# Patient Record
Sex: MALE | Race: BLACK OR AFRICAN AMERICAN | NOT HISPANIC OR LATINO | Employment: FULL TIME | URBAN - METROPOLITAN AREA
[De-identification: names, ages, dates, MRNs, and addresses within clinical notes are randomized per-mention and may not be internally consistent; named-entity substitution may affect disease eponyms.]

---

## 2019-10-20 ENCOUNTER — HOSPITAL ENCOUNTER (EMERGENCY)
Facility: CLINIC | Age: 64
Discharge: HOME | End: 2019-10-20
Attending: EMERGENCY MEDICINE | Admitting: EMERGENCY MEDICINE
Payer: COMMERCIAL

## 2019-10-20 VITALS
RESPIRATION RATE: 16 BRPM | HEART RATE: 70 BPM | HEIGHT: 65 IN | OXYGEN SATURATION: 99 % | SYSTOLIC BLOOD PRESSURE: 137 MMHG | BODY MASS INDEX: 25.71 KG/M2 | DIASTOLIC BLOOD PRESSURE: 59 MMHG | WEIGHT: 154.32 LBS | TEMPERATURE: 97.5 F

## 2019-10-20 DIAGNOSIS — M79.602 MUSCULOSKELETAL PAIN OF LEFT UPPER EXTREMITY: ICD-10-CM

## 2019-10-20 LAB — INTERPRETATION ECG - MUSE: NORMAL

## 2019-10-20 PROCEDURE — 99283 EMERGENCY DEPT VISIT LOW MDM: CPT

## 2019-10-20 PROCEDURE — 93005 ELECTROCARDIOGRAM TRACING: CPT

## 2019-10-20 RX ORDER — NAPROXEN 500 MG/1
500 TABLET ORAL EVERY 12 HOURS PRN
Qty: 20 TABLET | Refills: 0 | Status: SHIPPED | OUTPATIENT
Start: 2019-10-20 | End: 2023-01-02

## 2019-10-20 ASSESSMENT — MIFFLIN-ST. JEOR: SCORE: 1426.88

## 2019-10-20 ASSESSMENT — ENCOUNTER SYMPTOMS: NUMBNESS: 0

## 2019-10-20 NOTE — ED AVS SNAPSHOT
Emergency Department  [address] [place] [address]  Phone:  [phone]  Fax:  [phone]                                    Alba Pandya   MRN: [id number]    Department:   Emergency Department   Date of Visit:  10/20/2019           After Visit Summary Signature Page    I have received my discharge instructions, and my questions have been answered. I have discussed any challenges I see with this plan with the nurse or doctor.    ..........................................................................................................................................  Patient/Patient Representative Signature      ..........................................................................................................................................  Patient Representative Print Name and Relationship to Patient    ..................................................               ................................................  Date                                   Time    ..........................................................................................................................................  Reviewed by Signature/Title    ...................................................              ..............................................  Date                                               Time          22EPIC Rev 08/18

## 2019-10-20 NOTE — ED PROVIDER NOTES
"  History     Chief Complaint:  Arm Pain    The history is provided by the patient and a relative. The history is limited by a language barrier.      Alba Pandya is a 64 year old male who presents with left arm and left shoulder pain. He presents to the emergency department with his daughter, who is helping with translation at times. He reports that his pain came on 2 days ago during the day. He reports treating this with Advil with some relief but reports that it is difficult to sleep. He denies numbness or tingling. His daughter reports that he takes a blood pressure medication but denies that he has a history of heart attack or stroke.    Allergies:  No known drug allergies     Medications:    Blood pressure medication    Past Medical History:    HTN    Past Surgical History:    The patient does not have any pertinent past surgical history.    Family History:    No past pertinent family history.    Social History:  Patient presents to the emergency department with his daughter     Review of Systems   Musculoskeletal:        Left arm pain   Neurological: Negative for numbness.   All other systems reviewed and are negative.        Physical Exam     Patient Vitals for the past 24 hrs:   BP Temp Temp src Pulse Resp SpO2 Height Weight   10/20/19 1529 137/59 97.5  F (36.4  C) Oral 70 16 99 % 1.651 m (5' 5\") 70 kg (154 lb 5.2 oz)       Physical Exam  Constitutional: The patient is oriented to person, place, and time.   HENT:   Head: Atraumatic  Cardiovascular: . Intact distal pulses.    Musculoskeletal: No edema. No bony deformity. Normal range of motion. Tender along medial left scapula. Normal range of motion of shoulder. No spinal tenderness. Normal reflexes.  Neurological: The patient is alert and oriented to person, place, and time. The patient has normal strength and normal reflexes. No sensory deficit.  Skin: Skin is warm and dry. No rash noted. The patient is not diaphoretic.   Psychiatric: The patient has a " normal mood and affect.    Emergency Department Course   ECG:  Indication: arm pain  Time: 1537  Vent. Rate 68 bpm. CT interval 158. QRS duration 102. QT/QTc 382/406. P-R-T axis 59 42 59. Normal sinus rhythm. Normal ECG. Read time: 1541    Emergency Department Course:  Nursing notes and vitals reviewed. (1540) I performed an exam of the patient as documented above.     Findings and plan explained to the Patient. Patient discharged home with instructions regarding supportive care, medications, and reasons to return. The importance of close follow-up was reviewed. The patient was prescribed Naprosyn and Zanaflex    Impression & Plan      Medical Decision Making:  Alba Pandya, a 64 year old male, presents with a two day history of left arm pain that seemed to occur after waking a couple days ago. Based on history and exam, I suspect that this is musculoskeletal in nature, he has some tenderness along the medial scapular border and tightness through the left trapezius muscle. He has normal range of motion of the shoulder and no pain with palpation of the anterior shoulder. He has normal pulses and no swelling. I did consider atypical cardiac etiology, EKG is unremarkable. At this point, I feel that he can be safely discharged to home. I will have him use Naproxen for pain and Zanaflex for spasm or tightness. Return for problems    Diagnosis:    ICD-10-CM    1. Musculoskeletal pain of left upper extremity M79.602      Disposition:  discharged to home with Naprosyn and Zanaflex    Discharge Medications:  Discharge Medication List as of 10/20/2019  4:13 PM      START taking these medications    Details   naproxen (NAPROSYN) 500 MG tablet Take 1 tablet (500 mg) by mouth every 12 hours as needed for moderate pain, Disp-20 tablet, R-0, Local Print      tiZANidine (ZANAFLEX) 4 MG tablet Take 1 tablet (4 mg) by mouth 3 times daily as needed for muscle spasms, Disp-20 tablet, R-0, Local Print             José Miguel Hoang  10/20/2019     EMERGENCY DEPARTMENT  Scribe Disclosure:  I, José Miguel Hoang, am serving as a scribe on 10/20/2019 at 3:40 PM to personally document services performed by Rcihi Mancia MD based on my observations and the provider's statements to me.      Richi Mancia MD  10/20/19 [id number]

## 2020-03-11 ENCOUNTER — HEALTH MAINTENANCE LETTER (OUTPATIENT)
Age: 65
End: 2020-03-11

## 2020-07-19 ENCOUNTER — HOSPITAL ENCOUNTER (EMERGENCY)
Facility: CLINIC | Age: 65
Discharge: HOME | End: 2020-07-19
Attending: EMERGENCY MEDICINE | Admitting: EMERGENCY MEDICINE
Payer: COMMERCIAL

## 2020-07-19 VITALS
RESPIRATION RATE: 16 BRPM | OXYGEN SATURATION: 99 % | SYSTOLIC BLOOD PRESSURE: 179 MMHG | HEART RATE: 82 BPM | TEMPERATURE: 98.1 F | DIASTOLIC BLOOD PRESSURE: 95 MMHG

## 2020-07-19 DIAGNOSIS — R51.9 ACUTE NONINTRACTABLE HEADACHE, UNSPECIFIED HEADACHE TYPE: ICD-10-CM

## 2020-07-19 DIAGNOSIS — Z20.822 EXPOSURE TO 2019 NOVEL CORONAVIRUS: ICD-10-CM

## 2020-07-19 PROCEDURE — 99283 EMERGENCY DEPT VISIT LOW MDM: CPT

## 2020-07-19 PROCEDURE — C9803 HOPD COVID-19 SPEC COLLECT: HCPCS

## 2020-07-19 PROCEDURE — U0003 INFECTIOUS AGENT DETECTION BY NUCLEIC ACID (DNA OR RNA); SEVERE ACUTE RESPIRATORY SYNDROME CORONAVIRUS 2 (SARS-COV-2) (CORONAVIRUS DISEASE [COVID-19]), AMPLIFIED PROBE TECHNIQUE, MAKING USE OF HIGH THROUGHPUT TECHNOLOGIES AS DESCRIBED BY CMS-2020-01-R: HCPCS | Performed by: EMERGENCY MEDICINE

## 2020-07-19 PROCEDURE — 25000132 ZZH RX MED GY IP 250 OP 250 PS 637: Performed by: EMERGENCY MEDICINE

## 2020-07-19 RX ORDER — ACETAMINOPHEN 500 MG
1000 TABLET ORAL ONCE
Status: COMPLETED | OUTPATIENT
Start: 2020-07-19 | End: 2020-07-19

## 2020-07-19 RX ADMIN — ACETAMINOPHEN 1000 MG: 500 TABLET, FILM COATED ORAL at 17:24

## 2020-07-19 ASSESSMENT — ENCOUNTER SYMPTOMS
DIARRHEA: 0
FEVER: 0
COUGH: 0
VOMITING: 0
SORE THROAT: 1
SHORTNESS OF BREATH: 0
HEADACHES: 1

## 2020-07-19 NOTE — LETTER
July 21, 2020        Alba Pandya  [address]   [facility] [address]    This letter provides a written record that you were tested for COVID-19 on 07/19/20.     Your result was positive. This means you have COVID-19 (coronavirus).    This means that we found the virus that causes COVID-19 in your sample.    How can I protect others?If you have symptoms, stay home and away from others (self-isolate) until:You ve had no fever--and no medicine that reduces fever--for 3 full days (72 hours). And      Your other symptoms have gotten better. For example, your cough or breathing has improved. And     At least 10 days have passed since your symptoms started.    If you don t have symptoms: Stay home and away from others (self-isolate) until at least 10 days have passed since your first positive COVID-19 test.    During this time:    Stay in your own room, including for meals. Use your own bathroom if you can.    Stay away from others in your home. No hugging, kissing or shaking hands. No visitors.     Don t go to work, school or anywhere else.     Clean  high touch  surfaces often (doorknobs, counters, handles, etc.). Use a household cleaning spray or wipes. You ll find a full list on the EPA website at www.epa.gov/pesticide-registration/list-n-disinfectants-use-against-sars-cov-2.     Cover your mouth and nose with a mask, tissue or washcloth to avoid spreading germs.    Wash your hands and face often with soap and water.    Caregivers in these groups are at risk for severe illness due to COVID-19:  o People 65 years and older  o People who live in a nursing home or long-term care facility  o People with chronic disease (lung, heart, cancer, diabetes, kidney, liver, immunologic)  o People who have a weakened immune system, including those who:  - Are in cancer treatment  - Take medicine that weakens the immune system, such as corticosteroids  - Had a bone marrow or organ transplant  - Have an immune  deficiency  - Have poorly controlled HIV or AIDS  - Are obese (body mass index of 40 or higher)  - Smoke regularly    Caregivers should wear gloves while washing dishes, handling laundry and cleaning bedrooms and bathrooms.    Wash and dry laundry with special caution. Don t shake dirty laundry, and use the warmest water setting you can.    If you have a weakened immune system, ask your doctor about other actions you should take.    For more tips, go to www.cdc.gov/coronavirus/2019-ncov/downloads/10Things.pdf.    You should not go back to work until you meet the guidelines above for ending your home isolation. You should meet these along with any other guidelines that your employer has.    Employers: This document serves as formal notice of your employee s medical guidelines for going back to work. They must meet the above guidelines before going back to work in person.    How can I take care of myself?    1. Get lots of rest. Drink extra fluids (unless a doctor has told you not to).    2. Take Tylenol (acetaminophen) for fever or pain. If you have liver or kidney problems, ask your family doctor if it s okay to take Tylenol.     Take either:     650 mg (two 325 mg pills) every 4 to 6 hours, or     1,000 mg (two 500 mg pills) every 8 hours as needed.     Note: Don t take more than 3,000 mg in one day. Acetaminophen is found in many medicines (both prescribed and over-the-counter medicines). Read all labels to be sure you don t take too much.    For children, check the Tylenol bottle for the right dose (based on their age or weight).    3. If you have other health problems (like cancer, heart failure, an organ transplant or severe kidney disease): Call your specialty clinic if you don t feel better in the next 2 days.    4. Know when to call 911: Emergency warning signs include:    Trouble breathing or shortness of breath    Pain or pressure in the chest that doesn t go away    Feeling confused like you haven t felt  before, or not being able to wake up    Bluish-colored lips or face    5. Sign up for [employer]. We know it s scary to hear that you have COVID-19. We want to track your symptoms to make sure you re okay over the next 2 weeks. Please look for an email from [employer]--this is a free, online program that we ll use to keep in touch. To sign up, follow the link in the email. Learn more at [email or web address].      Where can I get more information?    [facility]: [email or web address]    Coronavirus Basics: www.health.[facility].mn.us/diseases/coronavirus/basics.html    What to Do If You re Sick: www.cdc.gov/coronavirus/2019-ncov/about/steps-when-sick.html    Ending Home Isolation: www.cdc.gov/coronavirus/2019-ncov/hcp/disposition-in-home-patients.html     Caring for Someone with COVID-19: www.cdc.gov/coronavirus/2019-ncov/if-you-are-sick/care-for-someone.html     [facility] clinical trials (COVID-19 research studies): [email or web address]

## 2020-07-19 NOTE — LETTER
July 19, 2020      To Whom It May Concern:      Alba Pandya was seen in our Emergency Department today, 07/19/20. He has been tested for COVID-19 and cannot return to work until test result is negative or, if positive, after symptoms are improved.    Sincerely,        Hazel Weinberg MD

## 2020-07-19 NOTE — ED AVS SNAPSHOT
Emergency Department  [address] [facility] [address]  Phone:  [phone]  Fax:  [phone]                                    Alba Pandya   MRN: [id number]    Department:   Emergency Department   Date of Visit:  7/19/2020           After Visit Summary Signature Page    I have received my discharge instructions, and my questions have been answered. I have discussed any challenges I see with this plan with the nurse or doctor.    ..........................................................................................................................................  Patient/Patient Representative Signature      ..........................................................................................................................................  Patient Representative Print Name and Relationship to Patient    ..................................................               ................................................  Date                                   Time    ..........................................................................................................................................  Reviewed by Signature/Title    ...................................................              ..............................................  Date                                               Time          22EPIC Rev 08/18

## 2020-07-19 NOTE — ED PROVIDER NOTES
History     Chief Complaint:  Cough    The history is provided by the patient.      Alba Pandya is a 64 year old male with a history of diabetes and HTN who presents with a concern for COVID-19. Yesterday, he developed a mild headache and a sore throat. He denies fever, cough, myalgias, vomiting, diarrhea, chest pain, or shortness of breath. However, he found out his sister-in-law, who he has had rejcent contact with, just tested positive for COVID-19 which prompted his concern.    Allergies:  No known drug allergies     Medications:    Naprosyn  Zanaflex  Aspirin  Zyrtec   Hygroton  Norvasc  Metformin  Zocor  Cozaar  Coreg    Past Medical History:    GERD  Diabetic retinopathy of both eyes  Diabetes type 2  Hyperlipidemia  Hypertension    Past Surgical History:    History reviewed. No pertinent surgical history.    Family History:    Diabetes    Social History:  Smoking status: never  Alcohol use: no  Marital Status:  Legally   Presents alone.    Review of Systems   Constitutional: Negative for fever.   HENT: Positive for sore throat.    Respiratory: Negative for cough and shortness of breath.    Cardiovascular: Negative for chest pain.   Gastrointestinal: Negative for diarrhea and vomiting.   Musculoskeletal: Negative for myalgias.   Neurological: Positive for headaches.   All other systems reviewed and are negative.    Physical Exam     Patient Vitals for the past 24 hrs:   BP Temp Temp src Pulse Heart Rate Resp SpO2   07/19/20 1724 -- -- -- -- -- -- 99 %   07/19/20 1723 (!) 179/95 -- -- 82 -- -- --   07/19/20 1619 (!) 187/109 98.1  F (36.7  C) Oral 85 85 16 100 %       Physical Exam  General: Well-developed and well-nourished. Well appearing middle aged man. Cooperative.  Head:  Atraumatic.  Eyes:  Conjunctivae, lids, and sclerae are normal.  ENT:    Normal nose. Moist mucous membranes.  No significant posterior oropharyngeal erythema, edema, or exudate.  Neck:  Supple. Normal range of  motion.  CV:  Regular rate and rhythm. Normal heart sounds with no murmurs, rubs, or gallops detected.  Resp:  No respiratory distress. Clear to auscultation bilaterally without decreased breath sounds, wheezing, rales, or rhonchi.  GI:  Non-distended.    MS:  Normal ROM.   Skin:  Warm. Non-diaphoretic. No pallor.  Neuro:  Awake. A&Ox3. Normal strength.  Psych: Normal mood and affect. Normal speech.  Vitals reviewed.      Emergency Department Course     Laboratory:  Laboratory findings were communicated with the patient who voiced understanding of the findings.    Symptomatic COVID-19 by PCR: In process     Interventions:  1724 Acetaminophen, 1000 mg, PO    Emergency Department Course:  Past medical records, nursing notes, and vitals reviewed.  1710: I performed an exam of the patient and obtained history, as documented above.     1714: I rechecked the patient. Findings and plan explained to the patient. Patient discharged home with instructions regarding supportive care, medications, and reasons to return. The importance of close follow-up was reviewed.     Impression & Plan      Medical Decision Making:  Alba is a 64 year old man who was exposed to his sister-in-law who found out yesterday she had COVID-19. He also developed headache and sore throat yesterday and was concerned this may represent COVID-19. He denies all other concerns or complaints including fever, cough, chest pain, shortness of breath, and myalgias. He appears quite well on exam and states he simply wants COVID-19 testing. I did give him Tylenol for his pains and swabbed him for COVID-19. However, because his complaints are quite mild and he appears quite well on exam with clear lungs, no respiratory distress, and no hypoxia, he does not require further urgent testing or treatment in the emergency department. I discussed supportive care for Alba while awaiting his COVID-19 test results including rest, fluids, over-the-counter  analgesics/antipyretics, and - most importantly - strict isolation. We discussed return precautions and otherwise he will follow-up with his primary care provider with phone or virtual visit.  All of his questions were answered and he verbalized understanding. Amenable to discharge.    Covid-19  Alba Pandya was evaluated during a global COVID-19 pandemic, which necessitated consideration that the patient might be at risk for infection with the SARS-CoV-2 virus that causes COVID-19.   Applicable protocols for evaluation were followed during the patient's care.   COVID-19 was considered as part of the patient's evaluation. The plan for testing is:  a test was obtained during this visit.      Diagnosis:    ICD-10-CM    1. Exposure to 2019 novel coronavirus  Z20.828 Symptomatic COVID-19 Virus (Coronavirus) by PCR   2. Acute nonintractable headache, unspecified headache type  R51        Disposition:  discharged home    Scribe Disclosure:  IMariana, am serving as a scribe at 5:38 PM on 7/19/2020 to document services personally performed by Hazel Weinberg MD based on my observations and the provider's statements to me.     Mariana Blair  7/19/2020    EMERGENCY DEPARTMENT       Hazel Weinberg MD  07/22/20 6728

## 2020-07-19 NOTE — DISCHARGE INSTRUCTIONS
You will be called if positive test results in 1-2 days.   You need to isolate for 10 days from when symptoms started AND 72 hours after fever resolves (without fever reducing medications) AND improvement of respiratory symptoms (whichever is longer)  You and any members of your household should limit activities in public for 14 days after starting home isolation.   Follow CDC recommendations for isolation, household cleaning, etc.  Lots of rest and fluids.  Tylenol or ibuprofen as needed for pain or fever.  Return with severe worsening of symptoms that would require hospitalization or new concerns.  Otherwise, follow up with your primary care provider via phone or virtual visit this week to ensure you are approving as expected.

## 2020-07-20 LAB
SARS-COV-2 RNA SPEC QL NAA+PROBE: ABNORMAL
SPECIMEN SOURCE: ABNORMAL

## 2020-07-21 ENCOUNTER — TELEPHONE (OUTPATIENT)
Dept: EMERGENCY MEDICINE | Facility: CLINIC | Age: 65
End: 2020-07-21

## 2020-07-22 ASSESSMENT — ENCOUNTER SYMPTOMS: MYALGIAS: 0

## 2020-09-22 NOTE — TELEPHONE ENCOUNTER
"Coronavirus (COVID-19) Notification    Caller Name (Patient, parent, daughter/son, grandparent, etc)  Alba Pandya (Patient) - patient answered the phone and handed it to his daughter, Eduardo, to speak with me. All information discussed with his daughter today per pt's wishes    Reason for call  Notify of Positive Coronavirus (COVID-19) lab results, assess symptoms,  review [facility] recommendations    Lab Result    Lab test:  2019-nCoV rRt-PCR or SARS-CoV-2 PCR    Oropharyngeal AND/OR nasopharyngeal swabs is POSITIVE for 2019-nCoV RNA/SARS-COV-2 PCR (COVID-19 virus)    RN Recommendations/Instructions per [facility] Coronavirus COVID-19 recommendations    Brief introduction script  Introduce self then review script:  \"I am calling on behalf of [employer].  We were notified that your Coronavirus test (COVID-19) for was POSITIVE for the virus.  I have some information to relay to you but first I wanted to mention that the [place] Dept of Health will be contacting you shortly [it's possible MD already called Patient] to talk to you more about how you are feeling and other people you have had contact with who might now also have the virus.  Also, [facility] is Partnering with the [facility] for Covid-19 research, you may be contacted directly by research staff.\"    Assessment (Inquire about Patient's current symptoms)   Assessment   Current Symptoms at time of phone call: (if no symptoms, document No symptoms] No symptoms. Sore throat. Someone in the home   Symptoms onset (if applicable) 7/18/20     If at time of call, Patients symptoms hare worsened, the Patient should contact 911 or have someone drive them to Emergency Dept promptly:      If Patient calling 911, inform 911 personal that you have tested positive for the Coronavirus (COVID-19).  Place mask on and await 911 to arrive.    If Emergency Dept, If possible, please have another adult drive you to the Emergency Dept but you need " Yes, pt would like to try [employer]. Please send into [facility].  F/yanique appt 10/6   to wear mask when in contact with other people.      Review information with Patient    Your result was positive. This means you have COVID-19 (coronavirus).  We have sent you a letter that reviews the information that I'll be reviewing with you now.    How can I protect others?    If you have symptoms: stay home and away from others (self-isolate) until:    You've had no fever--and no medicine that reduces fever--for 3 full days (72 hours). And      Your other symptoms have gotten better. For example, your cough or breathing has improved. And     At least 10 days have passed since your symptoms started.    If you don't have symptoms: Stay home and away from others (self-isolate) until at least 10 days have passed since your first positive COVID-19 test. (Date test collected)    During this time:    Stay in your own room, including for meals. Use your own bathroom if you can.    Stay away from others in your home. No hugging, kissing or shaking hands. No visitors.     Don't go to work, school or anywhere else.     Clean  high touch  surfaces often (doorknobs, counters, handles, etc.). Use a household cleaning spray or wipes. You'll find a full list on the EPA website at www.epa.gov/pesticide-registration/list-n-disinfectants-use-against-sars-cov-2.     Cover your mouth and nose with a mask, tissue or washcloth to avoid spreading germs.    Wash your hands and face often with soap and water.    Caregivers in these groups are at risk for severe illness due to COVID-19:  o People 65 years and older  o People who live in a nursing home or long-term care facility  o People with chronic disease (lung, heart, cancer, diabetes, kidney, liver, immunologic)  o People who have a weakened immune system, including those who:  - Are in cancer treatment  - Take medicine that weakens the immune system, such as corticosteroids  - Had a bone marrow or organ transplant  - Have an immune deficiency  - Have poorly controlled HIV or  AIDS  - Are obese (body mass index of 40 or higher)  - Smoke regularly    Caregivers should wear gloves while washing dishes, handling laundry and cleaning bedrooms and bathrooms.    Wash and dry laundry with special caution. Don't shake dirty laundry, and use the warmest water setting you can.    If you have a weakened immune system, ask your doctor about other actions you should take.    For more tips, go to www.cdc.gov/coronavirus/2019-ncov/downloads/10Things.pdf.    You should not go back to work until you meet the guidelines above for ending your home isolation. You should meet these along with any other guidelines that your employer has.    Employers: This document serves as formal notice of your employee's medical guidelines for going back to work. They must meet the above guidelines before going back to work in person.    How can I take care of myself?    1. Get lots of rest. Drink extra fluids (unless a doctor has told you not to).    2. Take Tylenol (acetaminophen) for fever or pain. If you have liver or kidney problems, ask your family doctor if it's okay to take Tylenol.     Take either:     650 mg (two 325 mg pills) every 4 to 6 hours, or     1,000 mg (two 500 mg pills) every 8 hours as needed.     Note: Don't take more than 3,000 mg in one day. Acetaminophen is found in many medicines (both prescribed and over-the-counter medicines). Read all labels to be sure you don't take too much.    For children, check the Tylenol bottle for the right dose (based on their age or weight).    3. If you have other health problems (like cancer, heart failure, an organ transplant or severe kidney disease): Call your specialty clinic if you don't feel better in the next 2 days.    4. Know when to call 911: Emergency warning signs include:    Trouble breathing or shortness of breath    Pain or pressure in the chest that doesn't go away    Feeling confused like you haven't felt before, or not being able to wake  up    Bluish-colored lips or face    5. Sign up for [employer]. We know it's scary to hear that you have COVID-19. We want to track your symptoms to make sure you're okay over the next 2 weeks. Please look for an email from [employer]--this is a free, online program that we'll use to keep in touch. To sign up, follow the link in the email. Learn more at [email or web address].    Where can I get more information?    [facility]: [email or web address]    Coronavirus Basics: www.health.[facility].[place]./diseases/coronavirus/basics.html    What to Do If You're Sick: www.cdc.gov/coronavirus/2019-ncov/about/steps-when-sick.html    Ending Home Isolation: www.cdc.gov/coronavirus/2019-ncov/hcp/disposition-in-home-patients.html     Caring for Someone with COVID-19: www.cdc.gov/coronavirus/2019-ncov/if-you-are-sick/care-for-someone.html     [facility] clinical trials (COVID-19 research studies): clinicalaffairs.[employer].[facility]/[employer]-clinical-trials     Positive COVID-19 letter sent via [employer] or mail (Yes/No):  Yes     [Name]  KILO Alonso, RN

## 2021-01-03 ENCOUNTER — HEALTH MAINTENANCE LETTER (OUTPATIENT)
Age: 66
End: 2021-01-03

## 2021-04-25 ENCOUNTER — HEALTH MAINTENANCE LETTER (OUTPATIENT)
Age: 66
End: 2021-04-25

## 2021-10-10 ENCOUNTER — HEALTH MAINTENANCE LETTER (OUTPATIENT)
Age: 66
End: 2021-10-10

## 2022-05-22 ENCOUNTER — HEALTH MAINTENANCE LETTER (OUTPATIENT)
Age: 67
End: 2022-05-22

## 2022-09-18 ENCOUNTER — HEALTH MAINTENANCE LETTER (OUTPATIENT)
Age: 67
End: 2022-09-18

## 2023-01-02 ENCOUNTER — APPOINTMENT (OUTPATIENT)
Dept: CT IMAGING | Facility: CLINIC | Age: 68
End: 2023-01-02
Attending: INTERNAL MEDICINE
Payer: COMMERCIAL

## 2023-01-02 ENCOUNTER — APPOINTMENT (OUTPATIENT)
Dept: GENERAL RADIOLOGY | Facility: CLINIC | Age: 68
End: 2023-01-02
Attending: EMERGENCY MEDICINE
Payer: COMMERCIAL

## 2023-01-02 ENCOUNTER — HOSPITAL ENCOUNTER (OUTPATIENT)
Facility: CLINIC | Age: 68
Setting detail: OBSERVATION
Discharge: HOME | End: 2023-01-03
Attending: EMERGENCY MEDICINE | Admitting: EMERGENCY MEDICINE
Payer: COMMERCIAL

## 2023-01-02 DIAGNOSIS — R94.31 ABNORMAL ELECTROCARDIOGRAM: ICD-10-CM

## 2023-01-02 DIAGNOSIS — R93.89 NODULAR RADIOLOGIC DENSITY: ICD-10-CM

## 2023-01-02 DIAGNOSIS — M79.622 PAIN OF LEFT UPPER ARM: ICD-10-CM

## 2023-01-02 LAB
ALBUMIN SERPL-MCNC: 3.9 G/DL (ref 3.4–5)
ALP SERPL-CCNC: 44 U/L (ref 40–150)
ALT SERPL W P-5'-P-CCNC: 13 U/L (ref 0–70)
ANION GAP SERPL CALCULATED.3IONS-SCNC: 8 MMOL/L (ref 3–14)
AST SERPL W P-5'-P-CCNC: 13 U/L (ref 0–45)
ATRIAL RATE - MUSE: 67 BPM
BASOPHILS # BLD AUTO: 0 10E3/UL (ref 0–0.2)
BASOPHILS NFR BLD AUTO: 1 %
BILIRUB SERPL-MCNC: 0.4 MG/DL (ref 0.2–1.3)
BUN SERPL-MCNC: 25 MG/DL (ref 7–30)
CALCIUM SERPL-MCNC: 8.5 MG/DL (ref 8.5–10.1)
CHLORIDE BLD-SCNC: 108 MMOL/L (ref 94–109)
CO2 SERPL-SCNC: 22 MMOL/L (ref 20–32)
CREAT SERPL-MCNC: 0.95 MG/DL (ref 0.66–1.25)
CREAT SERPL-MCNC: 1.24 MG/DL (ref 0.66–1.25)
D DIMER PPP FEU-MCNC: 0.81 UG/ML FEU (ref 0–0.5)
DIASTOLIC BLOOD PRESSURE - MUSE: NORMAL MMHG
EOSINOPHIL # BLD AUTO: 0.3 10E3/UL (ref 0–0.7)
EOSINOPHIL NFR BLD AUTO: 7 %
ERYTHROCYTE [DISTWIDTH] IN BLOOD BY AUTOMATED COUNT: 12.4 % (ref 10–15)
GFR SERPL CREATININE-BSD FRML MDRD: 65 ML/MIN/1.73M2
GFR SERPL CREATININE-BSD FRML MDRD: 89 ML/MIN/1.73M2
GLUCOSE BLD-MCNC: 180 MG/DL (ref 70–99)
HCT VFR BLD AUTO: 33.2 % (ref 40–53)
HGB BLD-MCNC: 11.1 G/DL (ref 13.3–17.7)
HOLD SPECIMEN: 0
HOLD SPECIMEN: NORMAL
HOLD SPECIMEN: NORMAL
IMM GRANULOCYTES # BLD: 0 10E3/UL
IMM GRANULOCYTES NFR BLD: 0 %
INTERPRETATION ECG - MUSE: NORMAL
LYMPHOCYTES # BLD AUTO: 1 10E3/UL (ref 0.8–5.3)
LYMPHOCYTES NFR BLD AUTO: 22 %
MCH RBC QN AUTO: 30.4 PG (ref 26.5–33)
MCHC RBC AUTO-ENTMCNC: 33.4 G/DL (ref 31.5–36.5)
MCV RBC AUTO: 91 FL (ref 78–100)
MONOCYTES # BLD AUTO: 0.3 10E3/UL (ref 0–1.3)
MONOCYTES NFR BLD AUTO: 7 %
NEUTROPHILS # BLD AUTO: 2.9 10E3/UL (ref 1.6–8.3)
NEUTROPHILS NFR BLD AUTO: 63 %
NRBC # BLD AUTO: 0 10E3/UL
NRBC BLD AUTO-RTO: 0 /100
P AXIS - MUSE: 43 DEGREES
PLATELET # BLD AUTO: 151 10E3/UL (ref 150–450)
POTASSIUM BLD-SCNC: 4.4 MMOL/L (ref 3.4–5.3)
PR INTERVAL - MUSE: 152 MS
PROT SERPL-MCNC: 7.7 G/DL (ref 6.8–8.8)
QRS DURATION - MUSE: 94 MS
QT - MUSE: 390 MS
QTC - MUSE: 412 MS
R AXIS - MUSE: 41 DEGREES
RBC # BLD AUTO: 3.65 10E6/UL (ref 4.4–5.9)
SODIUM SERPL-SCNC: 138 MMOL/L (ref 133–144)
SYSTOLIC BLOOD PRESSURE - MUSE: NORMAL MMHG
T AXIS - MUSE: -14 DEGREES
TROPONIN I SERPL HS-MCNC: 4 NG/L
VENTRICULAR RATE- MUSE: 67 BPM
WBC # BLD AUTO: 4.6 10E3/UL (ref 4–11)

## 2023-01-02 PROCEDURE — 36415 COLL VENOUS BLD VENIPUNCTURE: CPT | Performed by: HOSPITALIST

## 2023-01-02 PROCEDURE — 71046 X-RAY EXAM CHEST 2 VIEWS: CPT

## 2023-01-02 PROCEDURE — 250N000009 HC RX 250: Performed by: HOSPITALIST

## 2023-01-02 PROCEDURE — 36415 COLL VENOUS BLD VENIPUNCTURE: CPT | Performed by: EMERGENCY MEDICINE

## 2023-01-02 PROCEDURE — G0378 HOSPITAL OBSERVATION PER HR: HCPCS

## 2023-01-02 PROCEDURE — 85379 FIBRIN DEGRADATION QUANT: CPT | Performed by: INTERNAL MEDICINE

## 2023-01-02 PROCEDURE — 250N000011 HC RX IP 250 OP 636: Performed by: HOSPITALIST

## 2023-01-02 PROCEDURE — 99291 CRITICAL CARE FIRST HOUR: CPT | Mod: 25

## 2023-01-02 PROCEDURE — 82565 ASSAY OF CREATININE: CPT | Performed by: PHYSICIAN ASSISTANT

## 2023-01-02 PROCEDURE — 250N000013 HC RX MED GY IP 250 OP 250 PS 637: Performed by: HOSPITALIST

## 2023-01-02 PROCEDURE — 80053 COMPREHEN METABOLIC PANEL: CPT | Performed by: EMERGENCY MEDICINE

## 2023-01-02 PROCEDURE — 93005 ELECTROCARDIOGRAM TRACING: CPT

## 2023-01-02 PROCEDURE — 85025 COMPLETE CBC W/AUTO DIFF WBC: CPT | Performed by: EMERGENCY MEDICINE

## 2023-01-02 PROCEDURE — 84484 ASSAY OF TROPONIN QUANT: CPT | Performed by: HOSPITALIST

## 2023-01-02 PROCEDURE — 99222 1ST HOSP IP/OBS MODERATE 55: CPT | Mod: AI | Performed by: HOSPITALIST

## 2023-01-02 PROCEDURE — 99223 1ST HOSP IP/OBS HIGH 75: CPT | Mod: FS | Performed by: PHYSICIAN ASSISTANT

## 2023-01-02 PROCEDURE — 250N000013 HC RX MED GY IP 250 OP 250 PS 637: Performed by: EMERGENCY MEDICINE

## 2023-01-02 PROCEDURE — 84484 ASSAY OF TROPONIN QUANT: CPT | Performed by: EMERGENCY MEDICINE

## 2023-01-02 PROCEDURE — 71275 CT ANGIOGRAPHY CHEST: CPT

## 2023-01-02 RX ORDER — CLARITHROMYCIN 500 MG
500 TABLET ORAL 2 TIMES DAILY
Status: DISCONTINUED | OUTPATIENT
Start: 2023-01-02 | End: 2023-01-03 | Stop reason: HOSPADM

## 2023-01-02 RX ORDER — CHLORTHALIDONE 25 MG/1
25 TABLET ORAL DAILY
Status: ON HOLD | COMMUNITY
End: 2024-06-29

## 2023-01-02 RX ORDER — NITROGLYCERIN 0.4 MG/1
0.4 TABLET SUBLINGUAL EVERY 5 MIN PRN
Status: DISCONTINUED | OUTPATIENT
Start: 2023-01-02 | End: 2023-01-03 | Stop reason: HOSPADM

## 2023-01-02 RX ORDER — CARVEDILOL 25 MG/1
25 TABLET ORAL 2 TIMES DAILY WITH MEALS
Status: DISCONTINUED | OUTPATIENT
Start: 2023-01-02 | End: 2023-01-03 | Stop reason: HOSPADM

## 2023-01-02 RX ORDER — PANTOPRAZOLE SODIUM 40 MG/1
40 TABLET, DELAYED RELEASE ORAL 2 TIMES DAILY
Status: DISCONTINUED | OUTPATIENT
Start: 2023-01-02 | End: 2023-01-03 | Stop reason: HOSPADM

## 2023-01-02 RX ORDER — AMLODIPINE BESYLATE 10 MG/1
10 TABLET ORAL DAILY
Status: DISCONTINUED | OUTPATIENT
Start: 2023-01-03 | End: 2023-01-03 | Stop reason: HOSPADM

## 2023-01-02 RX ORDER — LOSARTAN POTASSIUM 100 MG/1
100 TABLET ORAL DAILY
Status: ON HOLD | COMMUNITY
End: 2024-06-29

## 2023-01-02 RX ORDER — AMMONIUM LACTATE 12 G/100G
LOTION TOPICAL 2 TIMES DAILY PRN
Status: DISCONTINUED | OUTPATIENT
Start: 2023-01-02 | End: 2023-01-03 | Stop reason: HOSPADM

## 2023-01-02 RX ORDER — LOSARTAN POTASSIUM 100 MG/1
100 TABLET ORAL DAILY
Status: DISCONTINUED | OUTPATIENT
Start: 2023-01-03 | End: 2023-01-03 | Stop reason: HOSPADM

## 2023-01-02 RX ORDER — SIMVASTATIN 20 MG
20 TABLET ORAL DAILY
COMMUNITY

## 2023-01-02 RX ORDER — AMOXICILLIN 500 MG/1
1000 CAPSULE ORAL 2 TIMES DAILY
COMMUNITY
End: 2024-06-28

## 2023-01-02 RX ORDER — AMMONIUM LACTATE 12 G/100G
LOTION TOPICAL 2 TIMES DAILY PRN
COMMUNITY

## 2023-01-02 RX ORDER — IOPAMIDOL 755 MG/ML
63 INJECTION, SOLUTION INTRAVASCULAR ONCE
Status: COMPLETED | OUTPATIENT
Start: 2023-01-02 | End: 2023-01-02

## 2023-01-02 RX ORDER — CARVEDILOL 25 MG/1
25 TABLET ORAL 2 TIMES DAILY WITH MEALS
COMMUNITY

## 2023-01-02 RX ORDER — METOPROLOL TARTRATE 50 MG
50-100 TABLET ORAL
Status: COMPLETED | OUTPATIENT
Start: 2023-01-02 | End: 2023-01-03

## 2023-01-02 RX ORDER — KETOCONAZOLE 20 MG/G
CREAM TOPICAL 2 TIMES DAILY PRN
COMMUNITY

## 2023-01-02 RX ORDER — ASPIRIN 81 MG/1
162 TABLET, CHEWABLE ORAL ONCE
Status: DISCONTINUED | OUTPATIENT
Start: 2023-01-02 | End: 2023-01-02

## 2023-01-02 RX ORDER — KETOCONAZOLE 20 MG/G
CREAM TOPICAL 2 TIMES DAILY PRN
Status: DISCONTINUED | OUTPATIENT
Start: 2023-01-02 | End: 2023-01-03 | Stop reason: HOSPADM

## 2023-01-02 RX ORDER — AMLODIPINE BESYLATE 10 MG/1
10 TABLET ORAL DAILY
COMMUNITY

## 2023-01-02 RX ORDER — MAGNESIUM HYDROXIDE/ALUMINUM HYDROXICE/SIMETHICONE 120; 1200; 1200 MG/30ML; MG/30ML; MG/30ML
30 SUSPENSION ORAL EVERY 4 HOURS PRN
Status: DISCONTINUED | OUTPATIENT
Start: 2023-01-02 | End: 2023-01-03 | Stop reason: HOSPADM

## 2023-01-02 RX ORDER — CLARITHROMYCIN 500 MG
500 TABLET ORAL 2 TIMES DAILY
COMMUNITY
End: 2024-06-28

## 2023-01-02 RX ORDER — AMOXICILLIN 500 MG/1
1000 CAPSULE ORAL 2 TIMES DAILY
Status: DISCONTINUED | OUTPATIENT
Start: 2023-01-02 | End: 2023-01-03 | Stop reason: HOSPADM

## 2023-01-02 RX ORDER — ASPIRIN 325 MG
325 TABLET ORAL ONCE
Status: COMPLETED | OUTPATIENT
Start: 2023-01-02 | End: 2023-01-02

## 2023-01-02 RX ORDER — ACETAMINOPHEN 500 MG
1000 TABLET ORAL EVERY 6 HOURS PRN
Status: DISCONTINUED | OUTPATIENT
Start: 2023-01-02 | End: 2023-01-03 | Stop reason: HOSPADM

## 2023-01-02 RX ORDER — CHLORTHALIDONE 25 MG/1
25 TABLET ORAL DAILY
Status: DISCONTINUED | OUTPATIENT
Start: 2023-01-03 | End: 2023-01-03 | Stop reason: HOSPADM

## 2023-01-02 RX ORDER — ASPIRIN 81 MG/1
81 TABLET ORAL DAILY
Status: DISCONTINUED | OUTPATIENT
Start: 2023-01-03 | End: 2023-01-03 | Stop reason: HOSPADM

## 2023-01-02 RX ADMIN — IOPAMIDOL 63 ML: 755 INJECTION, SOLUTION INTRAVENOUS at 18:56

## 2023-01-02 RX ADMIN — CARVEDILOL 25 MG: 25 TABLET, FILM COATED ORAL at 18:42

## 2023-01-02 RX ADMIN — SODIUM CHLORIDE 89 ML: 900 INJECTION INTRAVENOUS at 18:56

## 2023-01-02 RX ADMIN — CLARITHROMYCIN 500 MG: 500 TABLET, FILM COATED ORAL at 19:40

## 2023-01-02 RX ADMIN — ASPIRIN 325 MG ORAL TABLET 325 MG: 325 PILL ORAL at 14:28

## 2023-01-02 RX ADMIN — AMOXICILLIN 1000 MG: 500 CAPSULE ORAL at 19:40

## 2023-01-02 RX ADMIN — PANTOPRAZOLE SODIUM 40 MG: 40 TABLET, DELAYED RELEASE ORAL at 19:40

## 2023-01-02 ASSESSMENT — ACTIVITIES OF DAILY LIVING (ADL)
ADLS_ACUITY_SCORE: 35
ADLS_ACUITY_SCORE: 37
ADLS_ACUITY_SCORE: 35
ADLS_ACUITY_SCORE: 35
ADLS_ACUITY_SCORE: 37
ADLS_ACUITY_SCORE: 37

## 2023-01-02 NOTE — PROGRESS NOTES
RECEIVING UNIT ED HANDOFF REVIEW    ED Nurse Handoff Report was reviewed by: Tesfaye Alfredo RN on January 2, 2023 at 4:19 PM

## 2023-01-02 NOTE — CONSULTS
"[facility]  Cardiology Consultation     Date of Admission:  1/2/2023  Date of Consult (When I saw the patient): 01/02/23  Reason for Consult: Left Arm Pain    Primary Cardiologist: No previous cardiology    -------------------------------------------------------------------------------------------  Assessment:  Alba Pandya is a 67 year old male who was admitted on 1/2/2023. I was asked to see the patient for left arm pain.     # Left Arm Pain   -- initial hs trop neg  -- ECG with nonspecific TWI  -- Echo pending   -- no prior cardiac history but has some risk factors: T2DM, HTN, and HLD     # Mild Anemia   -- no clinic symptoms of bleeding     # HTN  -- at goal    # HLD   -- pta includes simvastatin, no recent LDL     # T2DM   -- hA1c 6.7% on oral agents    # Recent treatment of H Pylori    # Hx of Latent TB       Clinically Significant Risk Factors Present on Admission                # Overweight: Estimated body mass index is 27.29 kg/m  as calculated from the following:    Height as of this encounter: 1.651 m (5' 5\").    Weight as of this encounter: 74.4 kg (164 lb).    Limitations of activities/Fatigue (optional): Chronic Fatigue and Other Debilities: Age-related physical debility  Chronic fatigue, unspecified    -------------------------------------------------------------------------------------------  Plan:  -- Trend troponin   -- Full resting echocardiogram   -- NPO at midnight for CT coronary angiogram tomorrow (1/3)  -- Check D dimer and if high, will consider chest CT to rule out dissection   -- Recheck CBC   -- Cardiology will follow     -------------------------------------------------------------------------------------------    History of Present Illness   Alba Pandya is a 67 year old male who presents to the emergency department with 3-day history of left upper arm discomfort.    His past medical history is notable for hypertension (controlled), hyperlipidemia (no " recent antibiotic; on simvastatin), type 2 diabetes mellitus (hemoglobin A1c 6.7% on oral agents), GERD with recent treatment for H. pylori infection, and history of latent TB (treated with history of lung scarring).    I met Alba along with his 3 children today.  He describes left arm discomfort that starts from his shoulder and goes down to his elbow on the lateral aspect of his arm that feels like squeezing type of discomfort.  When he sits up or moves his arm above his head he develops sharp shooting pain that starts in front of his shoulder and wraps around under his armpit to his back of his shoulder.  He denies associated nausea, palpitations, shortness of breath, lightheadedness but his daughter reports occasionally he will have diaphoresis.  He has history of intermittent left arm discomfort over the last several years which usually gets worse during wintertime when he is exposed to cold.  He tells me he usually sleeps on his left side but he is unable to do so as his left arm hurts when he lays on it.  He denies upper mid back or lower back pain.  He denies palpitations, abdominal distention, PND, orthopnea, or bleeding issues.  He was recently started on treatment for H. pylori.  He has no personal history of cardiac stenting or other procedures.  Family history is not known.  He denies history of tobacco or alcohol use.    His initial troponin is normal at 0.4.  ECG demonstrates T wave inversions in lead II and aVF otherwise unremarkable.  Full resting echocardiogram is pending.  CBC shows mild anemia with hemoglobin of 11.  BMP shows normal electrolytes and renal function.  Chest x-ray shows no evidence of congestion or pleural effusion.  He does seem to have scarring most likely related to his history of latent TB.  No history of prior functional testing to rule out ischemia.     -------------------------------------------------------------------------------------------        Code Status    No  Order    Past Medical History   I have reviewed this patient's medical history and updated it with pertinent information if needed.   Past Medical History:   Diagnosis Date     Benign essential hypertension        Past Surgical History   I have reviewed this patient's surgical history and updated it with pertinent information if needed.  No past surgical history on file.    Prior to Admission Medications   Prior to Admission Medications   Prescriptions Last Dose Informant Patient Reported? Taking?   amLODIPine (NORVASC) 10 MG tablet 1/2/2023 Self Yes Yes   Sig: Take 10 mg by mouth daily   ammonium lactate (LAC-HYDRIN) 12 % external lotion Unknown at prn Self Yes Yes   Sig: Apply topically 2 times daily as needed for dry skin   amoxicillin (AMOXIL) 500 MG capsule 1/2/2023 Self Yes Yes   Sig: Take 1,000 mg by mouth 2 times daily X 14 days. Filled 12/31/22   carvedilol (COREG) 25 MG tablet 1/2/2023 Self Yes Yes   Sig: Take 25 mg by mouth 2 times daily (with meals)   chlorthalidone (HYGROTON) 25 MG tablet 1/2/2023 at am Self Yes Yes   Sig: Take 25 mg by mouth daily   clarithromycin (BIAXIN) 500 MG tablet 1/2/2023 Self Yes Yes   Sig: Take 500 mg by mouth 2 times daily X 14 days. Filled 12/31/22   ketoconazole (NIZORAL) 2 % external cream Unknown Self Yes Yes   Sig: Apply topically 2 times daily as needed for itching   losartan (COZAAR) 100 MG tablet 1/2/2023 Self Yes Yes   Sig: Take 100 mg by mouth daily   metFORMIN (GLUCOPHAGE) 1000 MG tablet 1/2/2023 Self Yes Yes   Sig: Take 1,000 mg by mouth 2 times daily (with meals)   omeprazole (PRILOSEC) 20 MG DR capsule 1/2/2023 Self Yes Yes   Sig: Take 20 mg by mouth 2 times daily   simvastatin (ZOCOR) 20 MG tablet 1/2/2023 at am Self Yes Yes   Sig: Take 20 mg by mouth daily      Facility-Administered Medications: None     Allergies   No Known Allergies    Social History   I have reviewed this patient's social history and updated it with pertinent information if needed. Alba ARROYO  [facility]      Family History   I have reviewed this patient's family history and updated it with pertinent information if needed.   No family history on file.    --------------------------------------------------------------------------------------------    Review of Systems   The 10 point Review of Systems is negative other than noted in the HPI or here.     Temp:  [98.4  F (36.9  C)] 98.4  F (36.9  C)  Pulse:  [69] 69  Resp:  [18] 18  BP: (112)/(52) 112/52  SpO2:  [98 %] 98 %  164 lbs 0 oz    Constitutional: NAD. Sitting comfortably. His adult children at bedside. Cooperative, alert and oriented, well developed, well nourished.  Eyes: Pupils equal and round, conjunctivae and lids unremarkable, sclera white, no xanthalasma,   Respiratory: CTA, el.   Cardiovascular: RRR without murmur, rub, or gallop. No JVD.  GI: Soft, nontender, no masses, active bowel sounds.    Skin: No rash, erythema, ecchymosis.  Musculoskeletal: Normal muscle tone and strength. Passive abduction and adduction caused no pain in his arm.   Neuropsychiatric: Oriented to time place and person.  Affect normal.  No gross motor deficits.  Extremities: No pitting edema of lower extremities.  Vascular: 2+ radial and DP pulses el.    Data   Reviewed.     Irvin Mares PA-C   1/2/2023  Pager: [phone]

## 2023-01-02 NOTE — H&P
[facility]    History and Physical - Hospitalist Service       Date of Admission:  1/2/2023    Assessment & Plan      Alba Pandya is a 67 year old male with past medical history of H. pylori on antibiotics, hypertension, hyperlipidemia, and diabetes mellitus admitted on 1/2/2023 with left arm pain and inferior T wave inversions concerning for ischemia    Left upper arm pain with inferior TWI, rule out CAD'  This pain appears to be noncardiac upon my examination but the ED physician did already speak to cardiology who recommended serial troponins, echocardiogram, and cardiology consultation.  He does have some risk factors for coronary artery disease including age, sex, hypertension, and diabetes  Plan  - register to observation  - Repeat EKG  - serial troponins  - telemetry  - echocardiogram  - cardiology consultation    Left perihilar opacity  History of latent TB, treated with history of lung scarring  Reports had previous x-ray while in [facility] [place] that showed some lung scarring  He denies any sort of pulmonary complaints including no cough, shortness of breath, chest pain, sputum production, weight loss  No history of tobacco use  Plan  -He can follow-up with his PCP to repeat a chest x-ray in a couple of months.  If there is no previous imaging that can be found he might benefit from a CT.    Recently started treatment for H. pylori  Plan  - Resume home amoxicillin, clarithromycin, and omeprazole      Chronic medical conditions  Diabetic mellitus  Hypertension  HLD  GERD  Plan  - resume meds including amlodipine 10 mg daily, carvedilol 25 mg twice daily, chlorthalidone 25 mg daily, losartan 100 mg daily, simvastatin 20 mg daily  - Sliding scale insulin and obtain hemoglobin A1c     Diet:   regular  DVT Prophylaxis: Pneumatic Compression Devices  Cervantes Catheter: Not present  Lines: None     Cardiac Monitoring: None  Code Status:   full code    Clinically Significant Risk Factors  "Present on Admission                  # Hypertension: home medication list includes antihypertensive(s)      # Overweight: Estimated body mass index is 27.29 kg/m  as calculated from the following:    Height as of this encounter: 1.651 m (5' 5\").    Weight as of this encounter: 74.4 kg (164 lb).           Disposition Plan      Expected Discharge Date: 01/03/2023                Quirino Hernandez DO  Hospitalist Service  [facility]  Securely message with [employer] (more info)  Text page via [employer] Paging/Directory     ______________________________________________________________________    Chief Complaint   Left arm pain    History is obtained from the patient    History of Present Illness   Alba Pandya is a 67 year old male who presents with 2 to 3 days of left arm pain.  This pain is centered in the left upper arm about the level of the shoulder to the elbow.  Cold temperature and movement such as adducting the elbow causes him to have worse pain.  He has not tried any medications at home.  He reports going up a flight of stairs or walking does not cause the pain to be worse.  He states the pain happened a few years ago when he came into the emergency department at that time.  I reviewed those records when he was seen in 2019.  The thought was musculoskeletal and they gave him naproxen and Zanaflex.  At that point in normal EKG was seen.    In the emergency department he underwent work-up including a chest x-ray notable of left perihilar opacity.  The patient however denies any sort of pulmonary symptoms including denying chest pain, shortness of breath, cough, hemoptysis, weight loss, night sweats, or shortness of breath.  He states he has a history of treatment for tuberculosis and some degree of lung scarring.  He states he had a previous chest x-ray which revealed this but this is only available in [place].      In the ED he also had an EKG revealing T wave inversions in 3 and aVF. "  Cardiology was consulted by the emergency provider and recommended serial troponins echocardiogram and cardiac consultation.      Past Medical History    Past Medical History:   Diagnosis Date     Benign essential hypertension        Past Surgical History   No past surgical history on file.    Prior to Admission Medications   Prior to Admission Medications   Prescriptions Last Dose Informant Patient Reported? Taking?   amLODIPine (NORVASC) 10 MG tablet 1/2/2023 Self Yes Yes   Sig: Take 10 mg by mouth daily   ammonium lactate (LAC-HYDRIN) 12 % external lotion Unknown at prn Self Yes Yes   Sig: Apply topically 2 times daily as needed for dry skin   amoxicillin (AMOXIL) 500 MG capsule 1/2/2023 Self Yes Yes   Sig: Take 1,000 mg by mouth 2 times daily X 14 days. Filled 12/31/22   carvedilol (COREG) 25 MG tablet 1/2/2023 Self Yes Yes   Sig: Take 25 mg by mouth 2 times daily (with meals)   chlorthalidone (HYGROTON) 25 MG tablet 1/2/2023 at am Self Yes Yes   Sig: Take 25 mg by mouth daily   clarithromycin (BIAXIN) 500 MG tablet 1/2/2023 Self Yes Yes   Sig: Take 500 mg by mouth 2 times daily X 14 days. Filled 12/31/22   ketoconazole (NIZORAL) 2 % external cream Unknown Self Yes Yes   Sig: Apply topically 2 times daily as needed for itching   losartan (COZAAR) 100 MG tablet 1/2/2023 Self Yes Yes   Sig: Take 100 mg by mouth daily   metFORMIN (GLUCOPHAGE) 1000 MG tablet 1/2/2023 Self Yes Yes   Sig: Take 1,000 mg by mouth 2 times daily (with meals)   omeprazole (PRILOSEC) 20 MG DR capsule 1/2/2023 Self Yes Yes   Sig: Take 20 mg by mouth 2 times daily   simvastatin (ZOCOR) 20 MG tablet 1/2/2023 at am Self Yes Yes   Sig: Take 20 mg by mouth daily      Facility-Administered Medications: None        Review of Systems    The 10 point Review of Systems is negative other than noted in the HPI or here.  Only pain as described above    Social History   I have reviewed this patient's social history and updated it with pertinent  information if needed.       Family History     No significant family history, including no history of: MI    Allergies   No Known Allergies     Physical Exam   Vital Signs: Temp: 98.4  F (36.9  C) Temp src: Temporal BP: 112/52 Pulse: 69   Resp: 18 SpO2: 98 % O2 Device: None (Room air)    Weight: 164 lbs 0 oz    Physical Exam  Constitutional:       Appearance: Normal appearance.   HENT:      Head: Normocephalic and atraumatic.      Nose: Nose normal.      Mouth/Throat:      Mouth: Mucous membranes are moist.      Pharynx: Oropharynx is clear.   Eyes:      Conjunctiva/sclera: Conjunctivae normal.      Pupils: Pupils are equal, round, and reactive to light.   Cardiovascular:      Rate and Rhythm: Normal rate and regular rhythm.      Pulses: Normal pulses.      Heart sounds: Normal heart sounds.   Pulmonary:      Effort: Pulmonary effort is normal.      Breath sounds: Normal breath sounds.   Abdominal:      General: Abdomen is flat. Bowel sounds are normal.      Palpations: Abdomen is soft.   Neurological:      General: No focal deficit present.      Mental Status: He is alert and oriented to person, place, and time.   Psychiatric:         Mood and Affect: Mood normal.         Behavior: Behavior normal.         Thought Content: Thought content normal.         Judgment: Judgment normal.           Medical Decision Making       MANAGEMENT DISCUSSED with the following over the past 24 hours: ED provider   NOTE(S)/MEDICAL RECORDS REVIEWED over the past 24 hours: ED records, patient PCP notes  Tests ORDERED & REVIEWED in the past 24 hours:  - BMP  - CBC  - Troponin  Tests personally interpreted in the past 24 hours:  - EKG tracing showing left perihilar opacity  - CHEST XRAY showing T wave inversions in 3 and avl  Medical complexity over the past 24 hours:  - workup for coronary artery disease including echo, cardiology consult, and serial troponins      Data     I have personally reviewed the following data over the past 24  hrs:    4.6  \   11.1 (L)   / 151     138 108 25 /  180 (H)   4.4 22 1.24 \       ALT: 13 AST: 13 AP: 44 TBILI: 0.4   ALB: 3.9 TOT PROTEIN: 7.7 LIPASE: N/A       Imaging results reviewed over the past 24 hrs:   Recent Results (from the past 24 hour(s))   XR Chest 2 Views    Narrative    XR CHEST TWO VIEWS   1/2/2023 12:30 PM     HISTORY: Arm and chest pain    COMPARISON: None available      Impression    IMPRESSION: PA and lateral views of the chest were obtained.  Cardiomediastinal silhouette is within normal limits. Patchy nodular  pulmonary opacity in the left perihilar area, indeterminate, could be  infectious. No significant pleural effusion or pneumothorax. Age  indeterminant rib fracture of the left eighth left rib.    MUKUL CARVAJAL MD         SYSTEM ID:  [id number]

## 2023-01-02 NOTE — ED PROVIDER NOTES
"  History     Chief Complaint:  Arm Pain       HPI   Alba Pandya is a 67 year old male who presents with left upper arm pain.  Of note, the patient's son is helping with translating per their request.  He states that he developed left upper arm pain yesterday that has been relatively constant and squeezing in nature.  It also hurts for him to sleep on that side or use the arm.  He denies any radiation of the pain to his chest or back, and he denies any shortness of breath, abdominal pain, or any other complaints.      Independent Historian: The patient and his son    Review of External Notes: ED provider note from 07- by Dr. Weinberg  ED provider note from 10- by Dr. Mancia    ROS:  Review of Systems   All other systems reviewed and are negative.        Allergies:  No Known Allergies     Medications:    Pantoprazole  Amlodipine  Coreg  Chlorthalidone  Losartan  Metformin  Simvastatin  Clarithromycin  Amoxicillin  Omeprazole    Past Medical History:    Hypertension  High cholesterol  Type II diabetes mellitus  GERD  Diabetic retinopathy  Hyperlipidemia  Tuberculosis    Family History:    Diabetes - father  GI problems - mother  Unspecified cancer - sister    Social History:  Patient came via private care.  Patient is accompanied in the ED.     PCP: Mina Carcamo     Physical Exam     Patient Vitals for the past 24 hrs:   BP Temp Temp src Pulse Resp SpO2 Height Weight   01/02/23 1109 112/52 98.4  F (36.9  C) Temporal 69 18 98 % 1.651 m (5' 5\") 74.4 kg (164 lb)        Physical Exam  Nursing note and vitals reviewed.  Constitutional:  Oriented to person, place, and time. Cooperative.   HENT:   Nose:    Nose normal.   Mouth/Throat:   Facemask in place.   Eyes:    Conjunctivae normal and EOM are normal.      Pupils are equal, round, and reactive to light.   Neck:    Trachea normal.   Cardiovascular:  Normal rate, regular rhythm, normal heart sounds and normal pulses. No murmur heard.  Pulmonary/Chest: "  Effort normal and breath sounds normal.   Abdominal:   Soft. Normal appearance and bowel sounds are normal.      There is no tenderness.      There is no rebound and no CVA tenderness.   Musculoskeletal:  Left upper arm is normal in appearance and no reproducible tenderness to palpation.  Full range of motion of the left shoulder and left elbow.  Extremities atraumatic x 4.   Lymphadenopathy:  No cervical adenopathy.   Neurological:   Alert and oriented to person, place, and time. Normal strength.      No cranial nerve deficit or sensory deficit. GCS eye subscore is 4. GCS verbal subscore is 5. GCS motor subscore is 6.   Skin:    Skin is intact. No rash noted.   Psychiatric:   Normal mood and affect.      Emergency Department Course   ECG:  ECG results from 01/02/23   EKG 12 lead     Value    Systolic Blood Pressure     Diastolic Blood Pressure     Ventricular Rate 67    Atrial Rate 67    MA Interval 152    QRS Duration 94        QTc 412    P Axis 43    R AXIS 41    T Axis -14    Interpretation ECG      Sinus rhythm  T wave abnormality, consider inferior ischemia  Abnormal ECG  When compared with ECG of 20-OCT-2019 15:37,  Non-specific change in ST segment in Inferior leads  T wave inversion now evident in Inferior leads  Confirmed by GENERATED REPORT, COMPUTER ([id number]),  Inez Naranjo ([id number]) on 1/2/2023 2:12:29 PM         Imaging:  XR Chest 2 Views   Final Result   IMPRESSION: PA and lateral views of the chest were obtained.   Cardiomediastinal silhouette is within normal limits. Patchy nodular   pulmonary opacity in the left perihilar area, indeterminate, could be   infectious. No significant pleural effusion or pneumothorax. Age   indeterminant rib fracture of the left eighth left rib.      MUKUL CARVAJAL MD            SYSTEM ID:  [id number]      Echocardiogram Complete    (Results Pending)   CTA  ANGIOGRAM CORONARY ARTERY    (Results Pending)      Report per radiology    Laboratory:  Labs Ordered and  Resulted from Time of ED Arrival to Time of ED Departure   COMPREHENSIVE METABOLIC PANEL - Abnormal       Result Value    Sodium 138      Potassium 4.4      Chloride 108      Carbon Dioxide (CO2) 22      Anion Gap 8      Urea Nitrogen 25      Creatinine 1.24      Calcium 8.5      Glucose 180 (*)     Alkaline Phosphatase 44      AST 13      ALT 13      Protein Total 7.7      Albumin 3.9      Bilirubin Total 0.4      GFR Estimate 65     CBC WITH PLATELETS AND DIFFERENTIAL - Abnormal    WBC Count 4.6      RBC Count 3.65 (*)     Hemoglobin 11.1 (*)     Hematocrit 33.2 (*)     MCV 91      MCH 30.4      MCHC 33.4      RDW 12.4      Platelet Count 151      % Neutrophils 63      % Lymphocytes 22      % Monocytes 7      % Eosinophils 7      % Basophils 1      % Immature Granulocytes 0      NRBCs per 100 WBC 0      Absolute Neutrophils 2.9      Absolute Lymphocytes 1.0      Absolute Monocytes 0.3      Absolute Eosinophils 0.3      Absolute Basophils 0.0      Absolute Immature Granulocytes 0.0      Absolute NRBCs 0.0     D DIMER QUANTITATIVE - Abnormal    D-Dimer Quantitative 0.81 (*)    TROPONIN I - Normal    Troponin I High Sensitivity 4     TROPONIN I - Normal    Troponin I High Sensitivity 4     TROPONIN I        Procedures   None    Emergency Department Course & Assessments:       Interventions:  1428 Aspirin 325 mg PO     Independent Interpretation (X-rays, CTs, rhythm strip):  I reviewed the patient's EKG, which shows deep inverted T waves in 3 and aVF, which are new.    Consultations/Discussion of Management or Tests:  1120 I obtained history and examined the patient as noted above.  1310 I consulted with Dr. Hernandez of the hospitalist service and discussed patient admission. He accepted care of the patient.  1315 I rechecked and updated the patient.        Disposition:  The patient was admitted to the hospital under the care of Dr. Hernandez.     Impression & Plan    CMS Diagnoses: None    Medical Decision  Making:  This is a 67-year-old male came in for further evaluation of left upper arm pain.  He has no other symptoms along with the pain, but the pain also was not reproducible.  He also has significant cardiac risk factors.  His EKG is quite a bit different today and specifically in leads III and aVF, which are concerning for cardiac ischemia.  Thankfully, his troponin is normal.  He also had a chest x-ray obtained, which shows the above findings and which I relayed to the patient and his son.  I spoke with the on-call cardiologist, Dr. Vargas, who is in agreement with bringing the patient into the hospital due to his abnormal EKG for further evaluation and management.  I then spoke with Dr. Hernandez, who will be taking care of him.    Diagnosis:    ICD-10-CM    1. Abnormal electrocardiogram  R94.31       2. Pain of left upper arm  M79.622       3. Nodular radiologic density  R93.89            Scribe Disclosure:  I, Tejal Mays, am serving as a scribe at 2:54 PM on 1/2/2023 to document services personally performed by Favio Barnett MD based on my observations and the provider's statements to me.    1/2/2023   Favio Barnett MD Lashkowitz, Seth H, MD  01/02/23 6630

## 2023-01-02 NOTE — ED TRIAGE NOTES
3 days of left arm pain, no CP or SOB. No known injury     Triage Assessment     Row Name 01/02/23 1121       Triage Assessment (Adult)    Airway WDL WDL       Respiratory WDL    Respiratory WDL WDL       Cardiac WDL    Cardiac WDL WDL       Cognitive/Neuro/Behavioral WDL    Cognitive/Neuro/Behavioral WDL WDL

## 2023-01-02 NOTE — ED NOTES
"[facility]  ED Nurse Handoff Report    ED Chief complaint: Arm Pain      ED Diagnosis:   Final diagnoses:   Abnormal electrocardiogram   Pain of left upper arm   Nodular radiologic density       Code Status: Full Code    Allergies: No Known Allergies    Patient Story: chest pain  Focused Assessment:  Patient presents to ED for chest pain, had abnormal EKG prompting to be admitted under observation for further evaluation.     Treatments and/or interventions provided: See MAR  Patient's response to treatments and/or interventions: TBD    To be done/followed up on inpatient unit:  close monitor    Does this patient have any cognitive concerns?:  na    Activity level - Baseline/Home:  Independent  Activity Level - Current:   Independent    Patient's Preferred language: Korean   Needed?: No    Isolation: None  Infection: Not Applicable  Patient tested for COVID 19 prior to admission: NO  Bariatric?: No    Vital Signs:   Vitals:    01/02/23 1109   BP: 112/52   BP Location: Right arm   Pulse: 69   Resp: 18   Temp: 98.4  F (36.9  C)   TempSrc: Temporal   SpO2: 98%   Weight: 74.4 kg (164 lb)   Height: 1.651 m (5' 5\")       Cardiac Rhythm:     Was the PSS-3 completed:   Yes  What interventions are required if any?               Family Comments: son at bedside  OBS brochure/video discussed/provided to patient/family: Yes              Name of person given brochure if not patient: na              Relationship to patient: na    For the majority of the shift this patient's behavior was Green.   Behavioral interventions performed were none.    ED NURSE PHONE NUMBER: *[phone]         "

## 2023-01-03 ENCOUNTER — APPOINTMENT (OUTPATIENT)
Dept: CARDIOLOGY | Facility: CLINIC | Age: 68
End: 2023-01-03
Attending: HOSPITALIST
Payer: COMMERCIAL

## 2023-01-03 ENCOUNTER — APPOINTMENT (OUTPATIENT)
Dept: CARDIOLOGY | Facility: CLINIC | Age: 68
End: 2023-01-03
Attending: PHYSICIAN ASSISTANT
Payer: COMMERCIAL

## 2023-01-03 VITALS
BODY MASS INDEX: 27.32 KG/M2 | SYSTOLIC BLOOD PRESSURE: 122 MMHG | TEMPERATURE: 97.7 F | HEART RATE: 66 BPM | WEIGHT: 164 LBS | OXYGEN SATURATION: 98 % | RESPIRATION RATE: 18 BRPM | DIASTOLIC BLOOD PRESSURE: 64 MMHG | HEIGHT: 65 IN

## 2023-01-03 LAB — LVEF ECHO: NORMAL

## 2023-01-03 PROCEDURE — 99232 SBSQ HOSP IP/OBS MODERATE 35: CPT | Mod: 25 | Performed by: INTERNAL MEDICINE

## 2023-01-03 PROCEDURE — 250N000009 HC RX 250: Performed by: INTERNAL MEDICINE

## 2023-01-03 PROCEDURE — 250N000011 HC RX IP 250 OP 636: Performed by: PHYSICIAN ASSISTANT

## 2023-01-03 PROCEDURE — 250N000013 HC RX MED GY IP 250 OP 250 PS 637: Performed by: PHYSICIAN ASSISTANT

## 2023-01-03 PROCEDURE — 75574 CT ANGIO HRT W/3D IMAGE: CPT

## 2023-01-03 PROCEDURE — 75574 CT ANGIO HRT W/3D IMAGE: CPT | Mod: 26 | Performed by: INTERNAL MEDICINE

## 2023-01-03 PROCEDURE — 93306 TTE W/DOPPLER COMPLETE: CPT

## 2023-01-03 PROCEDURE — 250N000013 HC RX MED GY IP 250 OP 250 PS 637: Performed by: HOSPITALIST

## 2023-01-03 PROCEDURE — G0378 HOSPITAL OBSERVATION PER HR: HCPCS

## 2023-01-03 PROCEDURE — 93306 TTE W/DOPPLER COMPLETE: CPT | Mod: 26 | Performed by: INTERNAL MEDICINE

## 2023-01-03 PROCEDURE — 99239 HOSP IP/OBS DSCHRG MGMT >30: CPT | Performed by: PHYSICIAN ASSISTANT

## 2023-01-03 RX ORDER — METHYLPREDNISOLONE SODIUM SUCCINATE 125 MG/2ML
125 INJECTION, POWDER, LYOPHILIZED, FOR SOLUTION INTRAMUSCULAR; INTRAVENOUS
Status: DISCONTINUED | OUTPATIENT
Start: 2023-01-03 | End: 2023-01-03 | Stop reason: HOSPADM

## 2023-01-03 RX ORDER — METOPROLOL TARTRATE 1 MG/ML
5-15 INJECTION, SOLUTION INTRAVENOUS
Status: DISCONTINUED | OUTPATIENT
Start: 2023-01-03 | End: 2023-01-03 | Stop reason: HOSPADM

## 2023-01-03 RX ORDER — ONDANSETRON 2 MG/ML
4 INJECTION INTRAMUSCULAR; INTRAVENOUS
Status: DISCONTINUED | OUTPATIENT
Start: 2023-01-03 | End: 2023-01-03 | Stop reason: HOSPADM

## 2023-01-03 RX ORDER — DIPHENHYDRAMINE HYDROCHLORIDE 50 MG/ML
25-50 INJECTION INTRAMUSCULAR; INTRAVENOUS
Status: DISCONTINUED | OUTPATIENT
Start: 2023-01-03 | End: 2023-01-03 | Stop reason: HOSPADM

## 2023-01-03 RX ORDER — LIDOCAINE 4 G/G
1 PATCH TOPICAL EVERY 24 HOURS
Qty: 5 PATCH | Refills: 1 | Status: SHIPPED | OUTPATIENT
Start: 2023-01-03 | End: 2024-06-28

## 2023-01-03 RX ORDER — DIPHENHYDRAMINE HCL 25 MG
25 CAPSULE ORAL
Status: DISCONTINUED | OUTPATIENT
Start: 2023-01-03 | End: 2023-01-03 | Stop reason: HOSPADM

## 2023-01-03 RX ORDER — IOPAMIDOL 755 MG/ML
500 INJECTION, SOLUTION INTRAVASCULAR ONCE
Status: COMPLETED | OUTPATIENT
Start: 2023-01-03 | End: 2023-01-03

## 2023-01-03 RX ADMIN — METOPROLOL TARTRATE 10 MG: 5 INJECTION INTRAVENOUS at 11:57

## 2023-01-03 RX ADMIN — AMOXICILLIN 1000 MG: 500 CAPSULE ORAL at 08:47

## 2023-01-03 RX ADMIN — CLARITHROMYCIN 500 MG: 500 TABLET, FILM COATED ORAL at 08:47

## 2023-01-03 RX ADMIN — LOSARTAN POTASSIUM 100 MG: 100 TABLET, FILM COATED ORAL at 08:47

## 2023-01-03 RX ADMIN — AMLODIPINE BESYLATE 10 MG: 10 TABLET ORAL at 08:47

## 2023-01-03 RX ADMIN — PANTOPRAZOLE SODIUM 40 MG: 40 TABLET, DELAYED RELEASE ORAL at 08:47

## 2023-01-03 RX ADMIN — CHLORTHALIDONE 25 MG: 25 TABLET ORAL at 08:47

## 2023-01-03 RX ADMIN — IOPAMIDOL 110 ML: 755 INJECTION, SOLUTION INTRAVENOUS at 12:37

## 2023-01-03 RX ADMIN — ASPIRIN 81 MG: 81 TABLET, COATED ORAL at 08:47

## 2023-01-03 RX ADMIN — NITROGLYCERIN 0.4 MG: 0.4 TABLET SUBLINGUAL at 12:28

## 2023-01-03 RX ADMIN — CARVEDILOL 25 MG: 25 TABLET, FILM COATED ORAL at 08:47

## 2023-01-03 RX ADMIN — METOPROLOL TARTRATE 100 MG: 50 TABLET, FILM COATED ORAL at 09:58

## 2023-01-03 ASSESSMENT — ACTIVITIES OF DAILY LIVING (ADL)
ADLS_ACUITY_SCORE: 37

## 2023-01-03 NOTE — PROGRESS NOTES
CT coronary angiogram showed mild nonobstructive CAD. Patient's left arm pain does not appear to be cardiac in etiology. He is already on statin therapy. His aspirin was discontinued recently due to GI upset and recent diagnosis of H. Pylori infection. We can discuss resumption of aspirin as outpatient if appropriate. Cardiology will sign off.     Irvin Mares PA-C   1/3/2023  Pager: [phone]

## 2023-01-03 NOTE — PLAN OF CARE
Hospitalist Service  [facility]   [address] [place], [place] [address]  Answering Service [phone]    To whom it may concern,    Alba Pandya was cared for by myself and the Hospitalist Service at [facility] from 1/2/2023 to 1/3/2023.  Alba Pandya's primary care clinic is [facility], in [place] and can be reached at phone number [phone].    I, as his hospitalist provider, recommend he be excused from work during 1/2/2023 through 1/6/2023.    Mr. Pandya can follow up with his primary care physician for adjustment of these recommendations as needed.    Sincerely,             Swapnil Jung PA-C

## 2023-01-03 NOTE — PROGRESS NOTES
"Cardiology Progress Note          Assessment and Plan:         67-year-old gentleman with a past medical history significant for hypertension, dyslipidemia and diabetes who was admitted on 2023 with left arm discomfort and an abnormal EKG with inferior wall T wave abnormalities.  He states that he has had similar symptoms approximately 2 years ago that were treated with NSAIDs and resolve spontaneously.  Cardiac troponins have been negative.  Due to an elevated D-dimer CT of the chest was obtained yesterday which demonstrated no evidence of pulmonary embolism or dissection.  Of note, no coronary artery calcification was noted.    IMPRESSION:    1.  Chest discomfort as described above.    PLAN  -CT coronary angiogram and echocardiogram today.  If both studies are within normal limits, we will plan on discharge later today given that the symptoms are most likely musculoskeletal in nature.          Interval History:     States that he slept better last night.  It appears that his pain improved with application of an ice pack.             Review of Systems:   As per subjective, otherwise 5 systems reviewed and negative.           Physical Exam:   Blood pressure 139/77, pulse 67, temperature 97.9  F (36.6  C), temperature source Oral, resp. rate 18, height 1.651 m (5' 5\"), weight 74.4 kg (164 lb), SpO2 97 %.      Vital Sign Ranges  Temperature Temp  Av.1  F (36.7  C)  Min: 97.9  F (36.6  C)  Max: 98.4  F (36.9  C)   Blood pressure Systolic (24hrs), Av , Min:112 , Max:158        Diastolic (24hrs), Av, Min:52, Max:77      Pulse Pulse  Av.4  Min: 67  Max: 74   Respirations Resp  Av.3  Min: 16  Max: 18   Pulse oximetry SpO2  Av.3 %  Min: 96 %  Max: 99 %       No intake or output data in the 24 hours ending 23 0808    Constitutional: NAD  Skin: Warm and dry  Neck: No JVD  Lungs: CTA  Cardiovascular: RRR, no m/r/g  Abdomen: Soft, non tender.  Extremities and Back: No LE " edema  Neurological: Nonfocal           Medications:     I have reviewed this patient's current medications.              Data:     Labs reviewed.             Ayesha Benoit MD, MPAULIE.

## 2023-01-03 NOTE — PROGRESS NOTES
Observation goals  PRIOR TO DISCHARGE        Comments:   -diagnostic tests and consults completed and resulted: , CT chest PE with contrast pending, angiogram tomorrow,   -vital signs normal or at patient baseline: partially Met  Nurse to notify provider when observation goals have been met and patient is ready for discharge.

## 2023-01-03 NOTE — PLAN OF CARE
Shift: 1/2/23. 4 p to 11 p  Summary: L arm pain  Orientation/Cognitive: A/Ox 4, Sinhala Speaking, daughter serves as , Allowed to stay in the room to assist  Observation Goals (Met/ Not Met): Not met , CT chest PE with contrast done  Mobility Level/Assist Equipment: Independent  Fall Risk (Y/N):NO  Behavior Concerns: Pleasant  Pain Management: Warm packs to L upper  arm  Tele/VS/O2:VSS on room air   ABNL Lab/BG: D dimer .81  Diet: Mod carb, NPO at [place]  Bowel/Bladder: Continent  Skin Concerns: Intact  Drains/Devices: R AC, 18 gauge  Tests/Procedures for next shift:Angiogram tomorrow  Anticipated DC date & active delays: pending   Patient Stated Goal for Today: Pain control, complete test  Other important info:Daughter allowed to stay overnight to assist

## 2023-01-03 NOTE — UTILIZATION REVIEW
Concurrent stay review; Secondary Review Determination     [facility]          Under the authority of the Utilization Management Committee, the utilization review process indicated a secondary review on the above patient.  The review outcome is based on review of the medical records, discussions with staff, and applying clinical experience noted on the date of the review.          (x) Observation Status Appropriate - Concurrent stay review    RATIONALE FOR DETERMINATION   67-year-old gentleman with a past medical history significant for hypertension, dyslipidemia and diabetes who was admitted on 1/2/2023 with left arm discomfort and an abnormal EKG with inferior wall T wave abnormalities.  He states that he has had similar symptoms approximately 2 years ago that were treated with NSAIDs and resolve spontaneously.  Cardiac troponins have been negative.  Due to an elevated D-dimer CT of the chest was obtained yesterday which demonstrated no evidence of pulmonary embolism or dissection.  Of note, no coronary artery calcification was noted.  PLAN  -CT coronary angiogram and echocardiogram today.  If both studies are within normal limits, we will plan on discharge later today given that the symptoms are most likely musculoskeletal in nature.   The severity of illness, intensity of service provided, expected LOS and risk for adverse outcome make the care appropriate for observation.      This document was produced using voice recognition software       The information on this document is developed by the utilization review team in order for the business office to ensure compliance.  This only denotes the appropriateness of proper admission status and does not reflect the quality of care rendered.         The definitions of Inpatient Status and Observation Status used in making the determination above are those provided in the CMS Coverage Manual, Chapter 1 and Chapter 6, section 70.4.      Sincerely,      YAMILEX BELTRAN MD    System Medical Director  Utilization Management  [facility].

## 2023-01-03 NOTE — PLAN OF CARE
Orientation/Cognitive: A/Ox 4, Bengali Speaking, daughter serves as , allowed to stay in the room overnight     Observation Goals (Met/ Not Met): Not met     Mobility Level/Assist Equipment: Independent    Fall Risk (Y/N): No    Behavior Concerns: Green     Pain Management: Left upper arm pain, warm pack applied     Tele/VS/O2: VSS on RA     ABNL Lab/BG: D dimer .81, Hgb 11.1     Diet: NPO @ [place]     Bowel/Bladder: Continent    Skin Concerns: no     Drains/Devices: PIV SL    Tests/Procedures for next shift: Echo, CT Angiography      Anticipated DC date & active delays: TBD     Patient Stated Goal for Today: Pain control, Rest         Observation goals  PRIOR TO DISCHARGE        Comments:   -diagnostic tests and consults completed and resulted: not met      -vital signs normal or at patient baseline: partially Met     Nurse to notify provider when observation goals have been met and patient is ready for discharge.

## 2023-01-03 NOTE — PROGRESS NOTES
Observation goals  PRIOR TO DISCHARGE        Comments:   -diagnostic tests and consults completed and resulted: not met     -vital signs normal or at patient baseline: partially Met    Nurse to notify provider when observation goals have been met and patient is ready for discharge.

## 2023-01-03 NOTE — DISCHARGE SUMMARY
[facility]    Discharge Summary  Hospitalist    Date of Admission:  1/2/2023  Date of Discharge:  1/3/2023  Discharging Provider: NANCIE Cortez  Date of Service (when I saw the patient): 01/03/23    Discharge Diagnoses   Left upper arm pain with EKG changes (inferior TWI), CT coronary angiogram with mild CAD  Left perihilar opacity  History of latent TB, treated with history of lung scarring  H. Pylori, currently under treatment    Chronic medical conditions  Diabetic mellitus  Hypertension  HLD  GERD    History of Present Illness   Alba Pandya is a 67 year old male with past medical history of H. pylori on antibiotics, hypertension, hyperlipidemia, and diabetes mellitus admitted on 1/2/2023 with left arm pain and inferior T wave inversions concerning for ischemia.    Please refer to the history and physical from Dr. Hernandez on 1/2/2023 for additional information    Hospital Course   Left upper arm pain with inferior TWI, rule out CAD  Admitting provider remarked that pain seems to be to be noncardiac on examination.  Cardiology consulted from the ED provider, and recommended serial troponins, echocardiogram, and formal cardiology consultation.  He does have some risk factors for coronary artery disease including age, sex, hypertension, and diabetes.  *He is a very active individual who works for [employer] and denies any chest discomfort with his normal everyday activities.  * Serial troponins negative x3. D-dimer is 0.81  -- Cardiology consulted, input appreciated  -- Echocardiogram showed EF 55-60%.  LV systolic function normal.  There is mild mitral regurgitation.  -- CT coronary angiogram on 1/3 showed mild nonobstructive coronary artery disease.     Patient's left arm pain does not appear to be cardiac in etiology. He is already on statin therapy. His aspirin was discontinued recently due to GI upset and recent diagnosis of H. Pylori infection.  Cardiology will discuss  resumption of aspirin as outpatient if appropriate.    Patient is encouraged to follow-up with PCP and have orthopedic surgery referral if his left shoulder pain persists to see if further imaging, treatment, and physical therapy is needed.  I provided lidocaine patch for temporary relief.  Ice as tolerated.  Otherwise patient can use OTCs such as Tylenol.     Left perihilar opacity  History of latent TB, treated with history of lung scarring  Reports had previous x-ray while in [place] that showed some lung scarring  He denies any sort of pulmonary complaints including no cough, shortness of breath, chest pain, sputum production, weight loss.  No history of tobacco use.     Recently started treatment for H. Pylori  - Resume home amoxicillin, clarithromycin, and omeprazole        Chronic medical conditions  Diabetic mellitus  Hypertension  HLD  GERD  - resume meds including amlodipine 10 mg daily, carvedilol 25 mg twice daily, chlorthalidone 25 mg daily, losartan 100 mg daily, simvastatin 20 mg daily  - Sliding scale insulin      Swapnil Jung PA-C    Significant Results and Procedures   As documented above.  Detailed reports in Epic record.    Pending Results   These results will be followed up by PCP.  Radiologist read of CT angio results.      Code Status   Full Code       Primary Care Physician   [facility]    Physical Exam   Temp: 97.7  F (36.5  C) Temp src: Oral BP: 122/64 Pulse: 66   Resp: 18 SpO2: 98 % O2 Device: None (Room air)    Vitals:    01/02/23 1109   Weight: 74.4 kg (164 lb)     Vital Signs with Ranges  Temp:  [97.7  F (36.5  C)-98  F (36.7  C)] 97.7  F (36.5  C)  Pulse:  [66-70] 66  Resp:  [16-18] 18  BP: (122-144)/(64-77) 122/64  SpO2:  [96 %-99 %] 98 %  I/O last 3 completed shifts:  In: 120 [P.O.:120]  Out: -     Constitutional: Alert, oriented.  Cooperative, lying in bed in NAD.  Respiratory:  Lungs CTAB.  No crackles, wheezes, or rhonchi, no labored breathing.  Cardiovascular:   Heart RRR, grade 2/6 systolic murmur, no edema.  GI:  Abdomen soft, NT/ND and with normoactive BS  Skin/Integumen:  Warm, dry, non-diaphoretic.  MSK: CMS x4 grossly intact. Left arm with ace wrap and ice pack to shoulder/bicep.      Discharge Disposition   Discharged to home  Condition at discharge: Stable    Consultations This Hospital Stay   CARDIOLOGY IP CONSULT    Time Spent on this Encounter   I, NANCIE Cortez, personally saw the patient today and spent greater than 30 minutes discharging this patient.    Discharge Orders      Reason for your hospital stay    Chest pain and left shoulder pain with EKG changes.  Your cardiac workup did not show evidence of heart attack or significant coronary artery disease.  It is also negative for pulmonary embolism or other pulmonary cause.  You pain may be musculoskeletal and can be followed up as outpatient with primary care.     Follow-up and recommended labs and tests     Follow up with primary care provider, Mina Carcamo, within 7 days for hospital follow- up. Follow up with Orthopedics if shoulder/arm pain is persistent.     Activity    Your activity upon discharge: activity as tolerated     Diet    Follow this diet upon discharge: Regular     Discharge Medications   Current Discharge Medication List      START taking these medications    Details   Lidocaine (LIDOCARE) 4 % Patch Place 1 patch onto the skin every 24 hours To prevent lidocaine toxicity, patient should be patch free for 12 hrs daily.  Qty: 5 patch, Refills: 1    Associated Diagnoses: Pain of left upper arm         CONTINUE these medications which have NOT CHANGED    Details   amLODIPine (NORVASC) 10 MG tablet Take 10 mg by mouth daily      ammonium lactate (LAC-HYDRIN) 12 % external lotion Apply topically 2 times daily as needed for dry skin      amoxicillin (AMOXIL) 500 MG capsule Take 1,000 mg by mouth 2 times daily X 14 days. Filled 12/31/22      carvedilol (COREG) 25 MG tablet Take  25 mg by mouth 2 times daily (with meals)      chlorthalidone (HYGROTON) 25 MG tablet Take 25 mg by mouth daily      clarithromycin (BIAXIN) 500 MG tablet Take 500 mg by mouth 2 times daily X 14 days. Filled 12/31/22      ketoconazole (NIZORAL) 2 % external cream Apply topically 2 times daily as needed for itching      losartan (COZAAR) 100 MG tablet Take 100 mg by mouth daily      metFORMIN (GLUCOPHAGE) 1000 MG tablet Take 1,000 mg by mouth 2 times daily (with meals)      omeprazole (PRILOSEC) 20 MG DR capsule Take 20 mg by mouth 2 times daily      simvastatin (ZOCOR) 20 MG tablet Take 20 mg by mouth daily           Allergies   No Known Allergies  Data   Most Recent 3 CBC's:  Recent Labs   Lab Test 01/02/23  1129   WBC 4.6   HGB 11.1*   MCV 91         Most Recent 3 BMP's:  Recent Labs   Lab Test 01/02/23  1529 01/02/23  1129   NA  --  138   POTASSIUM  --  4.4   CHLORIDE  --  108   CO2  --  22   BUN  --  25   CR 0.95 1.24   ANIONGAP  --  8   VINAY  --  8.5   GLC  --  180*     Most Recent 2 LFT's:  Recent Labs   Lab Test 01/02/23  1129   AST 13   ALT 13   ALKPHOS 44   BILITOTAL 0.4     Most Recent INR's and Anticoagulation Dosing History:  Anticoagulation Dose History    There is no flowsheet data to display.       Most Recent 3 Troponin's:No lab results found.  Most Recent Cholesterol Panel:No lab results found.  Most Recent 6 Bacteria Isolates From Any Culture (See EPIC Reports for Culture Details):No lab results found.  Most Recent TSH, T4 and A1c Labs:No lab results found.  Results for orders placed or performed during the hospital encounter of 01/02/23   XR Chest 2 Views    Narrative    XR CHEST TWO VIEWS   1/2/2023 12:30 PM     HISTORY: Arm and chest pain    COMPARISON: None available      Impression    IMPRESSION: PA and lateral views of the chest were obtained.  Cardiomediastinal silhouette is within normal limits. Patchy nodular  pulmonary opacity in the left perihilar area, indeterminate, could  be  infectious. No significant pleural effusion or pneumothorax. Age  indeterminant rib fracture of the left eighth left rib.    MUKUL CARVAJAL MD         SYSTEM ID:  [id number]   CT Chest Pulmonary Embolism w Contrast    Narrative    EXAM: CT CHEST PULMONARY EMBOLISM W CONTRAST  LOCATION: [facility]  DATE/TIME: 2023 7:10 PM    INDICATION: Elevated D dimer. Chest pain, arm pain.  COMPARISON: None.  TECHNIQUE: CT chest pulmonary angiogram during arterial phase injection of IV contrast. Multiplanar reformats and MIP reconstructions were performed. Dose reduction techniques were used.   CONTRAST: 63 mL Isovue 370    FINDINGS:  ANGIOGRAM CHEST: Pulmonary arteries are normal caliber and negative for pulmonary emboli. Thoracic aorta is negative for dissection.    LUNGS AND PLEURA: Mosaic attenuation of the lungs. Atelectasis or scarring of the medial left lower lobe. There are multiple left-sided high attenuation nodular opacities along the fissures.  There is linear atelectasis or scarring in the medial right   midlung.    MEDIASTINUM/AXILLAE: Normal.    CORONARY ARTERY CALCIFICATION: Cannot evaluate.    UPPER ABDOMEN: Nodular liver contour with enlargement of the caudate lobe.     MUSCULOSKELETAL: Normal.      Impression    IMPRESSION:  1.  No PE.  2.  Mosaic attenuation of the lungs suggests small airways disease.  3.  Scarring in the left lower lobe. Multiple high attenuation left lung nodules could be seen with prior hemorrhage, infection, or granulomatous disease.  4.  Hepatic parenchymal disease.   Echocardiogram Complete     Value    LVEF  55-60%    Narrative    [id number]  [id number]  [id number]  [id number]^PAO^NEHEMIAS^[facility]  Echocardiography Laboratory  [address] [place] [address]     Name: ABEL PRINCE  MRN: [id number]  : 1955  Study Date: 2023 08:22 AM  Age: 67 yrs  Gender: Male  Patient Location: [facility]  Reason For  Study: Angina Pectoris  Ordering Physician: NEHEMIAS CEDENO  Performed By: Bridget Niño     BSA: 1.8 m2  Height: 65 in  Weight: 164 lb  HR: 68  BP: 112/52 mmHg  ______________________________________________________________________________  Procedure  Complete Portable Echo Adult.  ______________________________________________________________________________  Interpretation Summary     The visual ejection fraction is 55-60%.  Left ventricular systolic function is normal.  The study was technically difficult.  ______________________________________________________________________________  Left Ventricle  The left ventricle is normal in size. There is normal left ventricular wall  thickness. Grade I or early diastolic dysfunction. Diastolic Doppler findings  (E/E' ratio and/or other parameters) suggest left ventricular filling  pressures are increased. The visual ejection fraction is 55-60%. Left  ventricular systolic function is normal.     Right Ventricle  The right ventricle is normal in size and function.     Atria  Normal left atrial size. Right atrial size is normal. There is no color  Doppler evidence of an atrial shunt.     Mitral Valve  There is mild (1+) mitral regurgitation.     Tricuspid Valve  There is trace tricuspid regurgitation. Right ventricular systolic pressure  could not be approximated due to inadequate tricuspid regurgitation.     Aortic Valve  The aortic valve is trileaflet. There is mild trileaflet aortic sclerosis. No  aortic regurgitation is present. No hemodynamically significant valvular  aortic stenosis.     Pulmonic Valve  There is no pulmonic valvular regurgitation. Normal pulmonic valve velocity.     Vessels  The aortic root is normal size. Normal size ascending aorta. IVC diameter <2.1  cm collapsing >50% with sniff suggests a normal RA pressure of 3 mmHg.     Pericardium  There is no pericardial effusion.     Rhythm  Sinus rhythm was  noted.  ______________________________________________________________________________  MMode/2D Measurements & Calculations     IVSd: 0.99 cm  LVIDd: 3.9 cm  LVIDs: 2.5 cm  LVPWd: 0.96 cm  FS: 37.5 %  LV mass(C)d: 120.1 grams  LV mass(C)dI: 66.1 grams/m2  Ao root diam: 3.1 cm  LA dimension: 3.5 cm  asc Aorta Diam: 3.4 cm  LA/Ao: 1.1  RWT: 0.49     Doppler Measurements & Calculations  MV E max ai: 82.5 cm/sec  MV A max ai: 105.7 cm/sec  MV E/A: 0.78  MV dec slope: 377.7 cm/sec2  MV dec time: 0.22 sec  PA acc time: 0.08 sec  E/E' av.2  Lateral E/e': 8.3  Medial E/e': 16.0     ______________________________________________________________________________  Report approved by: Monika Kinney 2023 09:41 AM         CTA  ANGIOGRAM CORONARY ARTERY    Narrative    Procedure: CT ANGIO CORONARY ARTERY   Examination Date:     Indication:  Left arm discomfort, negative troponins.     Clinical Information: chest pain, abnormal stress echo, 105cc optiray  350 ,(wasted  45cc)   Ordering Physician: MIO Vasquez PA-C     Overall quality of the study: Adequate.     PROCEDURE:The patient was positioned in the scanner gantry and an IV  was started using an 18 gauge IV in the right antecubital fossa.   Utilizing 110 cc  Isovue 370, wasted 0 cc, multi-slice computed  tomography was performed with a Siemens Dual Source Flash scanner  without incident. Beta-blockers were required to optimize heart rate,  patient was given Metoprolol 100 mg Oral, Metoprolol 10 mg  IV. The  patient was given pre-medication of sublingual Nitrostat 0.4 mg prior  to scanning. Coronary artery calcium score was performed using the  Flash scanner protocol. CTA was performed in the spiral mode at a  heart rate of 62 bpm with 100 kVp. Images were reconstructed and  analyzed on a [place] workstation. Scan protocol was optimized to  minimize radiation exposure. The total radiation exposure including  calcium score was calculated to be 208  DLP, and 3.39 mSv.      Impression    IMPRESSION:    1. Total Agatston score 28.8, placing the patient in the 60th  percentile when compared to age and gender matched control group.    2. Mild nonobstructive coronary artery disease    3.  Please review Radiology report for incidental noncardiac findings  that  will follow separately.        FINDINGS:    CORONARY CALCIUM SCORE: The total Agatston calcium score is 28.8, Left  main: 0, left anterior descendin.14,  circumflex: 12, right  coronary artery: 15.7. This places the patient in the 60th percentile  when compared to age and gender matched control group.    CORONARY CT ANGIOGRAPHY    DOMINANCE: Right dominant system.     LEFT MAIN: The left main arises normally from the left coronary cusp  and is widely patent without any stenosis or plaque.    LEFT ANTERIOR DESCENDING: The left anterior descending and its major  diagonal branches are widely patent. There is a small calcified plaque  proximally. Otherwise mild luminal irregularities only..      CIRCUMFLEX: Mild calcification in its proximal and mid section, no  significant stenoses. First OM appears free of significant obstructive  disease    RIGHT CORONARY ARTERY: Mild stenosis proximally due to presence of  predominantly calcified plaque, otherwise luminal irregularities  throughout this vessel and major branches.    ADDITIONAL FINDINGS:     The proximal ascending aorta is normal in size.     Normal pulmonary venous anatomy with all four pulmonary veins draining  into the left atrium.      There is no left ventricular mass or thrombus.     Normal pericardial thickness. There is no pericardial effusion.    The proximal pulmonary arteries are well opacified.    Please review Radiology report for incidental noncardiac findings that  will follow separately.    JAMA CONN MD         SYSTEM ID:  [id number]

## 2023-01-29 ENCOUNTER — HEALTH MAINTENANCE LETTER (OUTPATIENT)
Age: 68
End: 2023-01-29

## 2023-10-08 ENCOUNTER — HEALTH MAINTENANCE LETTER (OUTPATIENT)
Age: 68
End: 2023-10-08

## 2024-02-25 ENCOUNTER — HEALTH MAINTENANCE LETTER (OUTPATIENT)
Age: 69
End: 2024-02-25

## 2024-06-28 ENCOUNTER — HOSPITAL ENCOUNTER (OUTPATIENT)
Facility: CLINIC | Age: 69
Setting detail: OBSERVATION
Discharge: HOME | End: 2024-06-29
Attending: EMERGENCY MEDICINE | Admitting: STUDENT IN AN ORGANIZED HEALTH CARE EDUCATION/TRAINING PROGRAM
Payer: COMMERCIAL

## 2024-06-28 DIAGNOSIS — R52 PAIN: ICD-10-CM

## 2024-06-28 DIAGNOSIS — N17.9 AKI (ACUTE KIDNEY INJURY) (H): ICD-10-CM

## 2024-06-28 DIAGNOSIS — I10 ESSENTIAL HYPERTENSION: Primary | ICD-10-CM

## 2024-06-28 DIAGNOSIS — E87.5 HYPERKALEMIA: ICD-10-CM

## 2024-06-28 LAB
ALBUMIN SERPL BCG-MCNC: 4.6 G/DL (ref 3.5–5.2)
ALP SERPL-CCNC: 66 U/L (ref 40–150)
ALT SERPL W P-5'-P-CCNC: 10 U/L (ref 0–70)
ANION GAP SERPL CALCULATED.3IONS-SCNC: 10 MMOL/L (ref 7–15)
AST SERPL W P-5'-P-CCNC: 13 U/L (ref 0–45)
ATRIAL RATE - MUSE: 65 BPM
BASOPHILS # BLD AUTO: 0 10E3/UL (ref 0–0.2)
BASOPHILS NFR BLD AUTO: 1 %
BILIRUB SERPL-MCNC: <0.2 MG/DL
BUN SERPL-MCNC: 58 MG/DL (ref 8–23)
CALCIUM SERPL-MCNC: 9.1 MG/DL (ref 8.8–10.2)
CHLORIDE SERPL-SCNC: 107 MMOL/L (ref 98–107)
CREAT SERPL-MCNC: 1.79 MG/DL (ref 0.67–1.17)
DEPRECATED HCO3 PLAS-SCNC: 20 MMOL/L (ref 22–29)
DIASTOLIC BLOOD PRESSURE - MUSE: NORMAL MMHG
EGFRCR SERPLBLD CKD-EPI 2021: 41 ML/MIN/1.73M2
EOSINOPHIL # BLD AUTO: 0.2 10E3/UL (ref 0–0.7)
EOSINOPHIL NFR BLD AUTO: 6 %
ERYTHROCYTE [DISTWIDTH] IN BLOOD BY AUTOMATED COUNT: 12.7 % (ref 10–15)
GLUCOSE BLDC GLUCOMTR-MCNC: 100 MG/DL (ref 70–99)
GLUCOSE BLDC GLUCOMTR-MCNC: 110 MG/DL (ref 70–99)
GLUCOSE BLDC GLUCOMTR-MCNC: 142 MG/DL (ref 70–99)
GLUCOSE BLDC GLUCOMTR-MCNC: 172 MG/DL (ref 70–99)
GLUCOSE BLDC GLUCOMTR-MCNC: 195 MG/DL (ref 70–99)
GLUCOSE BLDC GLUCOMTR-MCNC: 43 MG/DL (ref 70–99)
GLUCOSE BLDC GLUCOMTR-MCNC: 86 MG/DL (ref 70–99)
GLUCOSE SERPL-MCNC: 121 MG/DL (ref 70–99)
HCT VFR BLD AUTO: 31.8 % (ref 40–53)
HGB BLD-MCNC: 10.9 G/DL (ref 13.3–17.7)
IMM GRANULOCYTES # BLD: 0 10E3/UL
IMM GRANULOCYTES NFR BLD: 0 %
INTERPRETATION ECG - MUSE: NORMAL
LYMPHOCYTES # BLD AUTO: 1.2 10E3/UL (ref 0.8–5.3)
LYMPHOCYTES NFR BLD AUTO: 31 %
MCH RBC QN AUTO: 30.2 PG (ref 26.5–33)
MCHC RBC AUTO-ENTMCNC: 34.3 G/DL (ref 31.5–36.5)
MCV RBC AUTO: 88 FL (ref 78–100)
MONOCYTES # BLD AUTO: 0.2 10E3/UL (ref 0–1.3)
MONOCYTES NFR BLD AUTO: 6 %
NEUTROPHILS # BLD AUTO: 2.1 10E3/UL (ref 1.6–8.3)
NEUTROPHILS NFR BLD AUTO: 56 %
NRBC # BLD AUTO: 0 10E3/UL
NRBC BLD AUTO-RTO: 0 /100
P AXIS - MUSE: 63 DEGREES
PLATELET # BLD AUTO: 126 10E3/UL (ref 150–450)
POTASSIUM SERPL-SCNC: 4.8 MMOL/L (ref 3.4–5.3)
POTASSIUM SERPL-SCNC: 4.8 MMOL/L (ref 3.4–5.3)
POTASSIUM SERPL-SCNC: 6.2 MMOL/L (ref 3.4–5.3)
PR INTERVAL - MUSE: 178 MS
PROT SERPL-MCNC: 7.2 G/DL (ref 6.4–8.3)
QRS DURATION - MUSE: 96 MS
QT - MUSE: 372 MS
QTC - MUSE: 386 MS
R AXIS - MUSE: 48 DEGREES
RBC # BLD AUTO: 3.61 10E6/UL (ref 4.4–5.9)
SODIUM SERPL-SCNC: 137 MMOL/L (ref 135–145)
SYSTOLIC BLOOD PRESSURE - MUSE: NORMAL MMHG
T AXIS - MUSE: 63 DEGREES
TROPONIN T SERPL HS-MCNC: 19 NG/L
VENTRICULAR RATE- MUSE: 65 BPM
WBC # BLD AUTO: 3.7 10E3/UL (ref 4–11)

## 2024-06-28 PROCEDURE — 258N000001 HC RX 258: Performed by: EMERGENCY MEDICINE

## 2024-06-28 PROCEDURE — 36415 COLL VENOUS BLD VENIPUNCTURE: CPT | Performed by: EMERGENCY MEDICINE

## 2024-06-28 PROCEDURE — 99222 1ST HOSP IP/OBS MODERATE 55: CPT | Performed by: STUDENT IN AN ORGANIZED HEALTH CARE EDUCATION/TRAINING PROGRAM

## 2024-06-28 PROCEDURE — 250N000009 HC RX 250: Performed by: STUDENT IN AN ORGANIZED HEALTH CARE EDUCATION/TRAINING PROGRAM

## 2024-06-28 PROCEDURE — 80053 COMPREHEN METABOLIC PANEL: CPT | Performed by: EMERGENCY MEDICINE

## 2024-06-28 PROCEDURE — 99292 CRITICAL CARE ADDL 30 MIN: CPT

## 2024-06-28 PROCEDURE — G0378 HOSPITAL OBSERVATION PER HR: HCPCS

## 2024-06-28 PROCEDURE — 258N000003 HC RX IP 258 OP 636: Performed by: EMERGENCY MEDICINE

## 2024-06-28 PROCEDURE — 99291 CRITICAL CARE FIRST HOUR: CPT | Mod: 25

## 2024-06-28 PROCEDURE — 96376 TX/PRO/DX INJ SAME DRUG ADON: CPT

## 2024-06-28 PROCEDURE — 82962 GLUCOSE BLOOD TEST: CPT

## 2024-06-28 PROCEDURE — 85025 COMPLETE CBC W/AUTO DIFF WBC: CPT | Performed by: EMERGENCY MEDICINE

## 2024-06-28 PROCEDURE — 96375 TX/PRO/DX INJ NEW DRUG ADDON: CPT

## 2024-06-28 PROCEDURE — 250N000012 HC RX MED GY IP 250 OP 636 PS 637: Performed by: EMERGENCY MEDICINE

## 2024-06-28 PROCEDURE — 84132 ASSAY OF SERUM POTASSIUM: CPT | Mod: 91 | Performed by: EMERGENCY MEDICINE

## 2024-06-28 PROCEDURE — 258N000002 HC RX IP 258 OP 250: Performed by: STUDENT IN AN ORGANIZED HEALTH CARE EDUCATION/TRAINING PROGRAM

## 2024-06-28 PROCEDURE — 93005 ELECTROCARDIOGRAM TRACING: CPT

## 2024-06-28 PROCEDURE — 84484 ASSAY OF TROPONIN QUANT: CPT | Performed by: EMERGENCY MEDICINE

## 2024-06-28 RX ORDER — ONDANSETRON 2 MG/ML
4 INJECTION INTRAMUSCULAR; INTRAVENOUS EVERY 6 HOURS PRN
Status: DISCONTINUED | OUTPATIENT
Start: 2024-06-28 | End: 2024-06-29 | Stop reason: HOSPADM

## 2024-06-28 RX ORDER — DEXTROSE MONOHYDRATE 25 G/50ML
25-50 INJECTION, SOLUTION INTRAVENOUS
Status: DISCONTINUED | OUTPATIENT
Start: 2024-06-28 | End: 2024-06-28

## 2024-06-28 RX ORDER — AMOXICILLIN 250 MG
2 CAPSULE ORAL 2 TIMES DAILY PRN
Status: DISCONTINUED | OUTPATIENT
Start: 2024-06-28 | End: 2024-06-29 | Stop reason: HOSPADM

## 2024-06-28 RX ORDER — ONDANSETRON 4 MG/1
4 TABLET, ORALLY DISINTEGRATING ORAL EVERY 6 HOURS PRN
Status: DISCONTINUED | OUTPATIENT
Start: 2024-06-28 | End: 2024-06-29 | Stop reason: HOSPADM

## 2024-06-28 RX ORDER — AMLODIPINE BESYLATE 10 MG/1
10 TABLET ORAL DAILY
Status: DISCONTINUED | OUTPATIENT
Start: 2024-06-29 | End: 2024-06-29 | Stop reason: HOSPADM

## 2024-06-28 RX ORDER — NICOTINE POLACRILEX 4 MG
15-30 LOZENGE BUCCAL
Status: DISCONTINUED | OUTPATIENT
Start: 2024-06-28 | End: 2024-06-29 | Stop reason: HOSPADM

## 2024-06-28 RX ORDER — ACETAMINOPHEN 325 MG/1
650 TABLET ORAL EVERY 4 HOURS PRN
Status: DISCONTINUED | OUTPATIENT
Start: 2024-06-28 | End: 2024-06-29 | Stop reason: HOSPADM

## 2024-06-28 RX ORDER — HYDRALAZINE HYDROCHLORIDE 20 MG/ML
10 INJECTION INTRAMUSCULAR; INTRAVENOUS EVERY 4 HOURS PRN
Status: DISCONTINUED | OUTPATIENT
Start: 2024-06-28 | End: 2024-06-29 | Stop reason: HOSPADM

## 2024-06-28 RX ORDER — ACETAMINOPHEN 650 MG/1
650 SUPPOSITORY RECTAL EVERY 4 HOURS PRN
Status: DISCONTINUED | OUTPATIENT
Start: 2024-06-28 | End: 2024-06-29 | Stop reason: HOSPADM

## 2024-06-28 RX ORDER — TRIAMCINOLONE ACETONIDE 1 MG/G
CREAM TOPICAL 2 TIMES DAILY PRN
COMMUNITY
Start: 2024-06-21

## 2024-06-28 RX ORDER — SENNOSIDES 8.6 MG
2 TABLET ORAL
COMMUNITY

## 2024-06-28 RX ORDER — LIDOCAINE 40 MG/G
CREAM TOPICAL
Status: DISCONTINUED | OUTPATIENT
Start: 2024-06-28 | End: 2024-06-29 | Stop reason: HOSPADM

## 2024-06-28 RX ORDER — NICOTINE POLACRILEX 4 MG
15-30 LOZENGE BUCCAL
Status: DISCONTINUED | OUTPATIENT
Start: 2024-06-28 | End: 2024-06-28

## 2024-06-28 RX ORDER — AMOXICILLIN 250 MG
1 CAPSULE ORAL 2 TIMES DAILY PRN
Status: DISCONTINUED | OUTPATIENT
Start: 2024-06-28 | End: 2024-06-29 | Stop reason: HOSPADM

## 2024-06-28 RX ORDER — DEXTROSE MONOHYDRATE 25 G/50ML
25 INJECTION, SOLUTION INTRAVENOUS ONCE
Status: COMPLETED | OUTPATIENT
Start: 2024-06-28 | End: 2024-06-28

## 2024-06-28 RX ORDER — DEXTROSE MONOHYDRATE 25 G/50ML
25-50 INJECTION, SOLUTION INTRAVENOUS
Status: DISCONTINUED | OUTPATIENT
Start: 2024-06-28 | End: 2024-06-29 | Stop reason: HOSPADM

## 2024-06-28 RX ORDER — CALCIUM CARBONATE 500 MG/1
1000 TABLET, CHEWABLE ORAL 4 TIMES DAILY PRN
Status: DISCONTINUED | OUTPATIENT
Start: 2024-06-28 | End: 2024-06-29 | Stop reason: HOSPADM

## 2024-06-28 RX ORDER — HYDRALAZINE HYDROCHLORIDE 10 MG/1
10 TABLET, FILM COATED ORAL EVERY 4 HOURS PRN
Status: DISCONTINUED | OUTPATIENT
Start: 2024-06-28 | End: 2024-06-29 | Stop reason: HOSPADM

## 2024-06-28 RX ORDER — CARVEDILOL 25 MG/1
25 TABLET ORAL 2 TIMES DAILY WITH MEALS
Status: DISCONTINUED | OUTPATIENT
Start: 2024-06-29 | End: 2024-06-29 | Stop reason: HOSPADM

## 2024-06-28 RX ADMIN — DEXTROSE MONOHYDRATE 300 ML: 100 INJECTION, SOLUTION INTRAVENOUS at 19:38

## 2024-06-28 RX ADMIN — SODIUM CHLORIDE 6 UNITS: 9 INJECTION, SOLUTION INTRAVENOUS at 19:38

## 2024-06-28 RX ADMIN — SODIUM CHLORIDE 1000 ML: 9 INJECTION, SOLUTION INTRAVENOUS at 19:07

## 2024-06-28 RX ADMIN — SODIUM BICARBONATE: 84 INJECTION, SOLUTION INTRAVENOUS at 23:54

## 2024-06-28 RX ADMIN — DEXTROSE MONOHYDRATE 25 G: 25 INJECTION, SOLUTION INTRAVENOUS at 19:38

## 2024-06-28 RX ADMIN — DEXTROSE MONOHYDRATE 50 ML: 25 INJECTION, SOLUTION INTRAVENOUS at 20:56

## 2024-06-28 ASSESSMENT — ACTIVITIES OF DAILY LIVING (ADL)
ADLS_ACUITY_SCORE: 35

## 2024-06-28 NOTE — ED PROVIDER NOTES
"  Emergency Department Note      History of Present Illness     Chief Complaint   Abnormal Labs      HPI   Alba Pandya is a 68 year old male with history of hypertension, hyperlipidemia, and type 2 diabetes who presents at the emergency department with his son for evaluation of abnormal labs. The patient's son reports that earlier today the patient got an EKG and blood work that indicated acute renal failure, and was instructed to be seen at the emergency department. The patient claims that he feels healthy. He denies abdominal pain, chest pain, fever, shortness of breath, vomiting, diarrhea, and leg swelling. Alba notes that he was seen at [facility] 3 weeks ago for constipation that has resolved. The patient's sone explains that he went to [facility] a week ago, with some dizziness, and labs returned a few days later with abnormal kidney function and potassium. Alba reports taking his regular medication on schedule, with no missed or changed prescriptions.  No significant recent NSAID usage.  Currently asymptomatic.    Independent Historian   None    Review of Eternal Notes   Reviewed nephrology note from today.     Past Medical History     Medical History and Problem List   Hypertension   Hyperlipidemia  Type 2 diabetes  GERD    Medications   Norvasc  Amoxicillin  Coreg  Hygroton  Biaxin  Cozaar  Metformin  Prilosec  Zocor    Physical Exam     Patient Vitals for the past 24 hrs:   BP Temp Temp src Pulse Resp SpO2 Height Weight   06/28/24 2101 115/62 -- -- 67 11 100 % -- --   06/28/24 2030 -- -- -- -- 18 100 % -- --   06/28/24 2000 117/65 -- -- 66 17 98 % -- --   06/28/24 1930 127/72 -- -- 62 18 100 % -- --   06/28/24 1920 133/70 98.1  F (36.7  C) Temporal 64 25 100 % -- --   06/28/24 1900 -- -- -- -- -- -- 1.664 m (5' 5.51\") 72.1 kg (159 lb)   06/28/24 1815 (!) 140/69 98.2  F (36.8  C) -- 68 16 98 % -- --     Physical Exam  General: Alert and cooperative with exam. Patient in mild distress. Normal " mentation.  Head:  Scalp is NC/AT  Eyes:  No scleral icterus, PERRL  ENT:  The external nose and ears are normal. The oropharynx is normal and without erythema; mucus membranes are moist.   Neck:  Normal range of motion without rigidity.  CV:  Regular rate and rhythm  Resp:  Breath sounds are clear bilaterally    Non-labored, no retractions or accessory muscle use  GI:  Abdomen is soft, no distension, no tenderness. No peritoneal signs  MS:  No lower extremity edema   Skin:  Warm and dry, No rash or lesions noted.  Neuro: Oriented x 3. No gross motor deficits.      Diagnostics     Lab Results   Labs Ordered and Resulted from Time of ED Arrival to Time of ED Departure   COMPREHENSIVE METABOLIC PANEL - Abnormal       Result Value    Sodium 137      Potassium 6.2 (*)     Carbon Dioxide (CO2) 20 (*)     Anion Gap 10      Urea Nitrogen 58.0 (*)     Creatinine 1.79 (*)     GFR Estimate 41 (*)     Calcium 9.1      Chloride 107      Glucose 121 (*)     Alkaline Phosphatase 66      AST 13      ALT 10      Protein Total 7.2      Albumin 4.6      Bilirubin Total <0.2     CBC WITH PLATELETS AND DIFFERENTIAL - Abnormal    WBC Count 3.7 (*)     RBC Count 3.61 (*)     Hemoglobin 10.9 (*)     Hematocrit 31.8 (*)     MCV 88      MCH 30.2      MCHC 34.3      RDW 12.7      Platelet Count 126 (*)     % Neutrophils 56      % Lymphocytes 31      % Monocytes 6      % Eosinophils 6      % Basophils 1      % Immature Granulocytes 0      NRBCs per 100 WBC 0      Absolute Neutrophils 2.1      Absolute Lymphocytes 1.2      Absolute Monocytes 0.2      Absolute Eosinophils 0.2      Absolute Basophils 0.0      Absolute Immature Granulocytes 0.0      Absolute NRBCs 0.0     GLUCOSE BY METER - Abnormal    GLUCOSE BY METER POCT 100 (*)    GLUCOSE BY METER - Abnormal    GLUCOSE BY METER POCT 43 (*)    GLUCOSE BY METER - Abnormal    GLUCOSE BY METER POCT 142 (*)    TROPONIN T, HIGH SENSITIVITY - Normal    Troponin T, High Sensitivity 19     POTASSIUM -  Normal    Potassium 4.8     GLUCOSE MONITOR NURSING POCT   GLUCOSE MONITOR NURSING POCT   GLUCOSE MONITOR NURSING POCT   POTASSIUM       EKG   ECG results from 06/28/24   EKG 12 lead     Value    Systolic Blood Pressure     Diastolic Blood Pressure     Ventricular Rate 65    Atrial Rate 65    MD Interval 178    QRS Duration 96        QTc 386    P Axis 63    R AXIS 48    T Axis 63    Interpretation ECG      Sinus rhythm  Normal ECG  When compared with ECG of 02-JAN-2023 11:03,  T wave inversion no longer evident in Inferior leads  Confirmed by GENERATED REPORT, COMPUTER ([id number]),  Dat Savage ([id number]) on 6/28/2024 7:09:48 PM           Independent Interpretation   None    ED Course      Medications Administered   Medications   glucose gel 15-30 g ( Oral See Alternative 6/28/24 2056)     Or   dextrose 50 % injection 25-50 mL (50 mLs Intravenous $Given 6/28/24 2056)     Or   glucagon injection 1 mg ( Subcutaneous See Alternative 6/28/24 2056)   dextrose 10% BOLUS 300 mL (300 mLs Intravenous $New Bag 6/28/24 1938)   sodium bicarbonate 100 mEq in sodium chloride 0.45 % 1,100 mL infusion (has no administration in time range)   sodium chloride 0.9% BOLUS 1,000 mL (0 mLs Intravenous Stopped 6/28/24 1937)   dextrose 50 % injection 25 g (25 g Intravenous $Given 6/28/24 1938)   insulin regular 1 unit/mL injection 6 Units (6 Units Intravenous $Given 6/28/24 1938)     Discussion of Management   None    Social Determinants of Health adding to complexity of care   None    ED Course   ED Course as of 06/28/24 2135 Fri Jun 28, 2024 1835 I obtained history and examined the patient as noted above     1908 Potassium(!!): 6.2   1914 I rechecked the patient and explained findings.     1952 I spoke with Dr. Bhatti, hospitalist, who accepts the patient.       Medical Decision Making / Diagnosis     CMS Diagnoses: None    MIPS       None    MDM   Alba Pandya is a 68 year old male presents with recent outpatient labs  demonstrating ELIZABETH and hyperkalemia.  Patient's medical history and records reviewed.  On evaluation patient is asymptomatic.  Vital signs normal.  No evidence of infectious process.  Abdominal exam benign.  Labs and EKG obtained.  EKG does demonstrate mild peaking of T waves and labs are notable for significant hyperkalemia (potassium 6.2) as well as improving ELIZABETH (creatinine 1.79, improved from 2.14 yesterday).  Given T wave changes, patient was provided 1 L IV fluids as well as insulin and glucose.  Later on recheck of blood glucose he was noted to be hypoglycemic was provided amp of D50 with resolution.  Patient was asymptomatic during episode of hypoglycemia.  Patient be admitted to the hospitalist service for further evaluation of hyperkalemia.    Disposition   The patient was admitted to the hospital.     Diagnosis     ICD-10-CM    1. Hyperkalemia  E87.5       2. ELIZABETH (acute kidney injury) (H24)  N17.9              Scribe Disclosure:  Figueroa CALDERÓN, am serving as a scribe at 6:47 PM on 6/28/2024 to document services personally performed by Luis Rocha DO based on my observations and the provider's statements to me.        Luis Rocha DO  06/28/24 [id number]

## 2024-06-28 NOTE — ED TRIAGE NOTES
Pt reports that he got n EKG at clinic and some blood work indicating acute renal failure     Blood work also indicated potassium is high     Denies CP SOB just states he feel dizzy sometimes

## 2024-06-29 ENCOUNTER — APPOINTMENT (OUTPATIENT)
Dept: ULTRASOUND IMAGING | Facility: CLINIC | Age: 69
End: 2024-06-29
Attending: STUDENT IN AN ORGANIZED HEALTH CARE EDUCATION/TRAINING PROGRAM
Payer: COMMERCIAL

## 2024-06-29 VITALS
TEMPERATURE: 97.8 F | RESPIRATION RATE: 15 BRPM | DIASTOLIC BLOOD PRESSURE: 69 MMHG | WEIGHT: 149.25 LBS | HEIGHT: 65 IN | OXYGEN SATURATION: 99 % | SYSTOLIC BLOOD PRESSURE: 128 MMHG | BODY MASS INDEX: 24.87 KG/M2 | HEART RATE: 62 BPM

## 2024-06-29 LAB
ANION GAP SERPL CALCULATED.3IONS-SCNC: 10 MMOL/L (ref 7–15)
BASOPHILS # BLD AUTO: 0 10E3/UL (ref 0–0.2)
BASOPHILS NFR BLD AUTO: 1 %
BUN SERPL-MCNC: 34.5 MG/DL (ref 8–23)
CALCIUM SERPL-MCNC: 9 MG/DL (ref 8.8–10.2)
CHLORIDE SERPL-SCNC: 108 MMOL/L (ref 98–107)
CREAT SERPL-MCNC: 0.83 MG/DL (ref 0.67–1.17)
DEPRECATED HCO3 PLAS-SCNC: 21 MMOL/L (ref 22–29)
EGFRCR SERPLBLD CKD-EPI 2021: >90 ML/MIN/1.73M2
EOSINOPHIL # BLD AUTO: 0.2 10E3/UL (ref 0–0.7)
EOSINOPHIL NFR BLD AUTO: 5 %
ERYTHROCYTE [DISTWIDTH] IN BLOOD BY AUTOMATED COUNT: 12.6 % (ref 10–15)
GLUCOSE BLDC GLUCOMTR-MCNC: 118 MG/DL (ref 70–99)
GLUCOSE BLDC GLUCOMTR-MCNC: 171 MG/DL (ref 70–99)
GLUCOSE BLDC GLUCOMTR-MCNC: 93 MG/DL (ref 70–99)
GLUCOSE SERPL-MCNC: 97 MG/DL (ref 70–99)
HBA1C MFR BLD: 5.8 %
HCT VFR BLD AUTO: 32 % (ref 40–53)
HGB BLD-MCNC: 11 G/DL (ref 13.3–17.7)
IMM GRANULOCYTES # BLD: 0 10E3/UL
IMM GRANULOCYTES NFR BLD: 0 %
LYMPHOCYTES # BLD AUTO: 0.9 10E3/UL (ref 0.8–5.3)
LYMPHOCYTES NFR BLD AUTO: 27 %
MCH RBC QN AUTO: 30 PG (ref 26.5–33)
MCHC RBC AUTO-ENTMCNC: 34.4 G/DL (ref 31.5–36.5)
MCV RBC AUTO: 87 FL (ref 78–100)
MONOCYTES # BLD AUTO: 0.2 10E3/UL (ref 0–1.3)
MONOCYTES NFR BLD AUTO: 7 %
NEUTROPHILS # BLD AUTO: 2 10E3/UL (ref 1.6–8.3)
NEUTROPHILS NFR BLD AUTO: 60 %
NRBC # BLD AUTO: 0 10E3/UL
NRBC BLD AUTO-RTO: 0 /100
PLATELET # BLD AUTO: 115 10E3/UL (ref 150–450)
POTASSIUM SERPL-SCNC: 5 MMOL/L (ref 3.4–5.3)
RBC # BLD AUTO: 3.67 10E6/UL (ref 4.4–5.9)
SODIUM SERPL-SCNC: 139 MMOL/L (ref 135–145)
WBC # BLD AUTO: 3.4 10E3/UL (ref 4–11)

## 2024-06-29 PROCEDURE — 99239 HOSP IP/OBS DSCHRG MGMT >30: CPT | Performed by: HOSPITALIST

## 2024-06-29 PROCEDURE — 82962 GLUCOSE BLOOD TEST: CPT

## 2024-06-29 PROCEDURE — G0378 HOSPITAL OBSERVATION PER HR: HCPCS

## 2024-06-29 PROCEDURE — 96365 THER/PROPH/DIAG IV INF INIT: CPT

## 2024-06-29 PROCEDURE — 96366 THER/PROPH/DIAG IV INF ADDON: CPT

## 2024-06-29 PROCEDURE — 76770 US EXAM ABDO BACK WALL COMP: CPT

## 2024-06-29 PROCEDURE — 83036 HEMOGLOBIN GLYCOSYLATED A1C: CPT | Performed by: STUDENT IN AN ORGANIZED HEALTH CARE EDUCATION/TRAINING PROGRAM

## 2024-06-29 PROCEDURE — 250N000013 HC RX MED GY IP 250 OP 250 PS 637: Performed by: STUDENT IN AN ORGANIZED HEALTH CARE EDUCATION/TRAINING PROGRAM

## 2024-06-29 PROCEDURE — 85025 COMPLETE CBC W/AUTO DIFF WBC: CPT | Performed by: STUDENT IN AN ORGANIZED HEALTH CARE EDUCATION/TRAINING PROGRAM

## 2024-06-29 PROCEDURE — 36415 COLL VENOUS BLD VENIPUNCTURE: CPT | Performed by: STUDENT IN AN ORGANIZED HEALTH CARE EDUCATION/TRAINING PROGRAM

## 2024-06-29 PROCEDURE — 80048 BASIC METABOLIC PNL TOTAL CA: CPT | Performed by: STUDENT IN AN ORGANIZED HEALTH CARE EDUCATION/TRAINING PROGRAM

## 2024-06-29 RX ORDER — LOSARTAN POTASSIUM 100 MG/1
100 TABLET ORAL SEE ADMIN INSTRUCTIONS
Status: SHIPPED
Start: 2024-06-29 | End: 2024-07-05

## 2024-06-29 RX ORDER — CHLORTHALIDONE 25 MG/1
12.5 TABLET ORAL DAILY
Status: SHIPPED
Start: 2024-06-29

## 2024-06-29 RX ORDER — ACETAMINOPHEN 325 MG/1
650 TABLET ORAL EVERY 6 HOURS PRN
COMMUNITY
Start: 2024-06-29

## 2024-06-29 RX ADMIN — CARVEDILOL 25 MG: 25 TABLET, FILM COATED ORAL at 07:35

## 2024-06-29 RX ADMIN — AMLODIPINE BESYLATE 10 MG: 10 TABLET ORAL at 07:35

## 2024-06-29 ASSESSMENT — ACTIVITIES OF DAILY LIVING (ADL)
ADLS_ACUITY_SCORE: 38
ADLS_ACUITY_SCORE: 38
ADLS_ACUITY_SCORE: 37
ADLS_ACUITY_SCORE: 38
ADLS_ACUITY_SCORE: 35
ADLS_ACUITY_SCORE: 38
ADLS_ACUITY_SCORE: 37
ADLS_ACUITY_SCORE: 38
ADLS_ACUITY_SCORE: 38
ADLS_ACUITY_SCORE: 37
ADLS_ACUITY_SCORE: 38
ADLS_ACUITY_SCORE: 37

## 2024-06-29 NOTE — DISCHARGE INSTRUCTIONS
Transportation Resource    Your [facility] insurance covers cab rides to/from medical appointments.      Call [phone] (or [phone]).    at least two days prior to your appointment.    Max distance is 30 miles to Primary Care doctor,   60 miles to Specialty Care doctor.     *this can also be used for same-day discharge ride from hospital to home    The following [facility] plan members are eligible for Health Ride. To find the name of your [facility] plan, check the front of your insurance ID card and look for  Care Type.   Prepaid Medical Assistance Program (PMAP)  [place] [employer] Plus (MSC+)  [facility] Connect (SNBC)  [facility] Connect + Medicare (HMO D-SNP)  [facility] s [facility] Health Options (MSHO) (O D-SNP)  Children on [place]  Adults on [facility] (limited to colonoscopy and mammogram services)

## 2024-06-29 NOTE — ED NOTES
[facility]  ED Nurse Handoff Report    ED Chief complaint: Abnormal Labs      ED Diagnosis:   Final diagnoses:   Hyperkalemia   ELIZABETH (acute kidney injury) (H24)       Code Status: Per the admitting provider     Allergies: No Known Allergies    Patient Story:  Pt repots his Potassium level done on 6/21 was elevated and that his doctor instructed to come to the ER for further evaluation.      Focused Assessment:   Pt A/O x 4,  VSS denied chest pain, nausea or cramping. His  Serum  elevated @ 6.2.  (See Meds given) His repeat Serum Potassium = 4.8. Pt with low BG( See Labs)  Pt asymptomatic, denied pain. Pt given D50. Repeat Bg = 142 mg/dl.  Awaiting  Sodium Bicarb solution to arrive from Main Pharmacy. Spoke with the ED pharmacist and she is working on this.     Treatments and/or interventions provided: lab, EKG, NS, D50 x 2, Dextrose 300 ml @ 75 ml /hr, Insulin 6 units IVP, BG q 30 mins.     Results for orders placed or performed during the hospital encounter of 06/28/24   Comprehensive metabolic panel     Status: Abnormal   Result Value Ref Range    Sodium 137 135 - 145 mmol/L    Potassium 6.2 (HH) 3.4 - 5.3 mmol/L    Carbon Dioxide (CO2) 20 (L) 22 - 29 mmol/L    Anion Gap 10 7 - 15 mmol/L    Urea Nitrogen 58.0 (H) 8.0 - 23.0 mg/dL    Creatinine 1.79 (H) 0.67 - 1.17 mg/dL    GFR Estimate 41 (L) >60 mL/min/1.73m2    Calcium 9.1 8.8 - 10.2 mg/dL    Chloride 107 98 - 107 mmol/L    Glucose 121 (H) 70 - 99 mg/dL    Alkaline Phosphatase 66 40 - 150 U/L    AST 13 0 - 45 U/L    ALT 10 0 - 70 U/L    Protein Total 7.2 6.4 - 8.3 g/dL    Albumin 4.6 3.5 - 5.2 g/dL    Bilirubin Total <0.2 <=1.2 mg/dL   Troponin T, High Sensitivity     Status: Normal   Result Value Ref Range    Troponin T, High Sensitivity 19 <=22 ng/L   CBC with platelets and differential     Status: Abnormal   Result Value Ref Range    WBC Count 3.7 (L) 4.0 - 11.0 10e3/uL    RBC Count 3.61 (L) 4.40 - 5.90 10e6/uL    Hemoglobin 10.9 (L) 13.3 -  17.7 g/dL    Hematocrit 31.8 (L) 40.0 - 53.0 %    MCV 88 78 - 100 fL    MCH 30.2 26.5 - 33.0 pg    MCHC 34.3 31.5 - 36.5 g/dL    RDW 12.7 10.0 - 15.0 %    Platelet Count 126 (L) 150 - 450 10e3/uL    % Neutrophils 56 %    % Lymphocytes 31 %    % Monocytes 6 %    % Eosinophils 6 %    % Basophils 1 %    % Immature Granulocytes 0 %    NRBCs per 100 WBC 0 <1 /100    Absolute Neutrophils 2.1 1.6 - 8.3 10e3/uL    Absolute Lymphocytes 1.2 0.8 - 5.3 10e3/uL    Absolute Monocytes 0.2 0.0 - 1.3 10e3/uL    Absolute Eosinophils 0.2 0.0 - 0.7 10e3/uL    Absolute Basophils 0.0 0.0 - 0.2 10e3/uL    Absolute Immature Granulocytes 0.0 <=0.4 10e3/uL    Absolute NRBCs 0.0 10e3/uL   Potassium     Status: Normal   Result Value Ref Range    Potassium 4.8 3.4 - 5.3 mmol/L   Glucose by meter     Status: Abnormal   Result Value Ref Range    GLUCOSE BY METER POCT 100 (H) 70 - 99 mg/dL   Glucose by meter     Status: Abnormal   Result Value Ref Range    GLUCOSE BY METER POCT 43 (LL) 70 - 99 mg/dL   EKG 12 lead     Status: None   Result Value Ref Range    Systolic Blood Pressure  mmHg    Diastolic Blood Pressure  mmHg    Ventricular Rate 65 BPM    Atrial Rate 65 BPM    MA Interval 178 ms    QRS Duration 96 ms     ms    QTc 386 ms    P Axis 63 degrees    R AXIS 48 degrees    T Axis 63 degrees    Interpretation ECG       Sinus rhythm  Normal ECG  When compared with ECG of 02-JAN-2023 11:03,  T wave inversion no longer evident in Inferior leads  Confirmed by GENERATED REPORT, COMPUTER ([id number]),  Dat Savage ([id number]) on 6/28/2024 7:09:48 PM     CBC with platelets + differential     Status: Abnormal    Narrative    The following orders were created for panel order CBC with platelets + differential.  Procedure                               Abnormality         Status                     ---------                               -----------         ------                     CBC with platelets and d...[[id number]]  Abnormal            Final  result                 Please view results for these tests on the individual orders.      Patient's response to treatments and/or interventions: Stable     To be done/followed up on inpatient unit:   Se order     Does this patient have any cognitive concerns?:  No     Activity level - Baseline/Home:  Independent  Activity Level - Current:   Independent    Patient's Preferred language: Hungarian   Needed?: No    Isolation: None  Infection: Not Applicable  Patient tested for COVID 19 prior to admission: YES  Bariatric?: No    Vital Signs:   Vitals:    06/28/24 1930 06/28/24 2000 06/28/24 2030 06/28/24 2101   BP: 127/72 117/65  115/62   Pulse: 62 66  67   Resp: 18 17 18 11   Temp:       TempSrc:       SpO2: 100% 98% 100% 100%   Weight:       Height:           Cardiac Rhythm:  NSR rate 65    Was the PSS-3 completed:   Yes  What interventions are required if any?               Family Comments:  Son is at the bedside   OBS brochure/video discussed/provided to patient/family: Yes              Name of person given brochure if not patient: N/A               Relationship to patient: N/A     For the majority of the shift this patient's behavior was Green.   Behavioral interventions performed were N/ A.    ED NURSE PHONE NUMBER: *[phone]

## 2024-06-29 NOTE — PHARMACY-ADMISSION MEDICATION HISTORY
Pharmacist Admission Medication History    Admission medication history is complete. The information provided in this note is only as accurate as the sources available at the time of the update.    Information Source(s): Patient, Family member, and CareEverywhere/SureScripts via in-person    Pertinent Information: Med hx obtained from patient and son. They report chlorthalidone and olmesartan were stopped today by patient's primary provider. Plan was to switch back on losartan which patient had previously been on.  Took AM and PM meds (took evening meds at 5pm prior to coming to ED)    Changes made to PTA medication list:  Added: senna, triamcinolone  Deleted: omeprazole, amoxicillin, clarithromycin, lidocaine patch  Changed: None    Allergies reviewed with patient and updates made in EHR: yes    Medication History Completed By: Shanta Palomino RPH 6/28/2024 8:50 PM    PTA Med List   Medication Sig Note Last Dose    amLODIPine (NORVASC) 10 MG tablet Take 10 mg by mouth daily  6/28/2024    ammonium lactate (LAC-HYDRIN) 12 % external lotion Apply topically 2 times daily as needed for dry skin  Unknown    carvedilol (COREG) 25 MG tablet Take 25 mg by mouth 2 times daily (with meals)  6/28/2024    chlorthalidone (HYGROTON) 25 MG tablet Take 25 mg by mouth daily 6/28/2024: Told by PCP to stop this medication but took AM dose prior to getting instructed to stop  6/28/2024    ketoconazole (NIZORAL) 2 % external cream Apply topically 2 times daily as needed for itching  Unknown    losartan (COZAAR) 100 MG tablet Take 100 mg by mouth daily   at Took olmesartan 40 mg today (6/28) but was told to switch back to losartan which he was previously on    metFORMIN (GLUCOPHAGE) 1000 MG tablet Take 1,000 mg by mouth 2 times daily (with meals)  6/28/2024    sennosides (SENOKOT) 8.6 MG tablet Take 2 tablets by mouth Six days a week  6/28/2024    simvastatin (ZOCOR) 20 MG tablet Take 20 mg by mouth daily  6/28/2024    triamcinolone (KENALOG)  0.1 % external cream Apply topically 2 times daily as needed for irritation  Unknown

## 2024-06-29 NOTE — H&P
"[facility]    History and Physical - Hospitalist Service       Date of Admission:  6/28/2024    Assessment & Plan      Alba Pandya is a 68 year old male admitted on 6/28/2024 for ELIZABETH and hyperkalemia    ELIZABETH  Hyperkalemia  Follows closely with afshan and was noted to have ELIZABETH and elevated K on routine lab work 06/21. Follow up outpatient labs today were worse. Nephrology e-consult line at [facility] recommended 24 hour hospital admission with IVF and holding olmesartan. Suspect etiology of ELIZABETH to be from medications and volume depletion. Patient reports taking advil and ibuprofen 2 weeks ago for shoulder pain but none since then. Within the ED, K at 6.2, Bicarb 20, Cr 1.79. Patient given 6 units of regular insulin in the ED for mildly peaked T waves.    - admit to observation  - will follow repeat K, if still elevated can give lokelma  - hold olmesartan and start IVF with sodium bicarb to help shift the K  - obtain renal US  - consider further work up for ELIZABETH vs discharge home tomorrow pending clinical trend    Essential HTN  Resume PTA amlodipine  - hold olmesartan as above    Diabetes type 2  - hold metformin and start sliding scale insulin          Diet:  low potassium  DVT Prophylaxis: Pneumatic Compression Devices  Cervantes Catheter: Not present  Lines: None     Cardiac Monitoring: None  Code Status:  FULL    Clinically Significant Risk Factors Present on Admission        # Hyperkalemia: Highest K = 6.2 mmol/L in last 2 days, will monitor as appropriate         # Thrombocytopenia: Lowest platelets = 126 in last 2 days, will monitor for bleeding   # Hypertension: Home medication list includes antihypertensive(s)    # Anemia: based on hgb <11           # Overweight: Estimated body mass index is 26.05 kg/m  as calculated from the following:    Height as of this encounter: 1.664 m (5' 5.51\").    Weight as of this encounter: 72.1 kg (159 lb).              Disposition Plan     Medically Ready " for Discharge: Anticipated Tomorrow           Maricarmen Bhatti DO  Hospitalist Service  [facility]  Securely message with [employer] (more info)  Text page via [employer] Paging/Directory     ______________________________________________________________________    Chief Complaint   hyperkalemia    History is obtained from the patient    History of Present Illness   Patient had routine lab work done earlier in the week that demonstrated ELIZABETH and elevated K. This was followed up today with repeat labs that were worse. He denies any symptoms. No recent illness. No dysuria or hematuria. Maybe a little straining when he urinates but nothing severe. NO swelling in his legs. No chest pain or SOB. He was updated on plan for observation with family at bedside      Past Medical History    Past Medical History:   Diagnosis Date    Benign essential hypertension        Past Surgical History   No past surgical history on file.    Prior to Admission Medications   Prior to Admission Medications   Prescriptions Last Dose Informant Patient Reported? Taking?   Lidocaine (LIDOCARE) 4 % Patch   No No   Sig: Place 1 patch onto the skin every 24 hours To prevent lidocaine toxicity, patient should be patch free for 12 hrs daily.   amLODIPine (NORVASC) 10 MG tablet  Self Yes No   Sig: Take 10 mg by mouth daily   ammonium lactate (LAC-HYDRIN) 12 % external lotion  Self Yes No   Sig: Apply topically 2 times daily as needed for dry skin   amoxicillin (AMOXIL) 500 MG capsule  Self Yes No   Sig: Take 1,000 mg by mouth 2 times daily X 14 days. Filled 12/31/22   carvedilol (COREG) 25 MG tablet  Self Yes No   Sig: Take 25 mg by mouth 2 times daily (with meals)   chlorthalidone (HYGROTON) 25 MG tablet  Self Yes No   Sig: Take 25 mg by mouth daily   clarithromycin (BIAXIN) 500 MG tablet  Self Yes No   Sig: Take 500 mg by mouth 2 times daily X 14 days. Filled 12/31/22   ketoconazole (NIZORAL) 2 % external cream  Self Yes No   Sig:  Apply topically 2 times daily as needed for itching   losartan (COZAAR) 100 MG tablet  Self Yes No   Sig: Take 100 mg by mouth daily   metFORMIN (GLUCOPHAGE) 1000 MG tablet  Self Yes No   Sig: Take 1,000 mg by mouth 2 times daily (with meals)   omeprazole (PRILOSEC) 20 MG DR capsule  Self Yes No   Sig: Take 20 mg by mouth 2 times daily   simvastatin (ZOCOR) 20 MG tablet  Self Yes No   Sig: Take 20 mg by mouth daily      Facility-Administered Medications: None        Review of Systems    ROS negative as stated above     Physical Exam   Vital Signs: Temp: 98.2  F (36.8  C)   BP: (!) 140/69 Pulse: 68   Resp: 16 SpO2: 98 %      Weight: 159 lbs 0 oz    Constitutional: Awake, alert, cooperative, no apparent distress.  Eyes: Conjunctiva and pupils examined and normal.  HEENT: Moist mucous membranes, normal dentition.  Respiratory: Clear to auscultation bilaterally, no crackles or wheezing.  Cardiovascular: Regular rate and rhythm, normal S1 and S2, and no murmur noted.  GI: Soft, non-distended, non-tender, normal bowel sounds.  Skin: No rashes, no cyanosis, no edema.  Musculoskeletal: No joint swelling, erythema or tenderness.  Neurologic: Cranial nerves 2-12 intact, normal strength and sensation.  Psychiatric: Alert, oriented to person, place and time, no obvious anxiety or depression.     Medical Decision Making       70 MINUTES SPENT BY ME on the date of service doing chart review, history, exam, documentation & further activities per the note.      Data     I have personally reviewed the following data over the past 24 hrs:    3.7 (L)  \   10.9 (L)   / 126 (L)     137 107 58.0 (H) /  100 (H)   4.8 20 (L) 1.79 (H) \     ALT: 10 AST: 13 AP: 66 TBILI: <0.2   ALB: 4.6 TOT PROTEIN: 7.2 LIPASE: N/A     Trop: 19 BNP: N/A       Imaging results reviewed over the past 24 hrs:   No results found for this or any previous visit (from the past 24 hour(s)).

## 2024-06-29 NOTE — DISCHARGE SUMMARY
[facility]  Hospitalist Discharge Summary      Date of Admission:  6/28/2024  Date of Discharge:  6/29/2024  Discharging Provider: Swapnil Leger MD  Discharge Service: Hospitalist Service    Discharge Diagnoses   Acute kidney injury, resolved  Hyperkalemia, resolved  Essential hypertension  Type 2 diabetes mellitus  Pancytopenia with leukopenia, anemia, and thrombocytopenia - needs outpatient follow-up with primary clinic provider  Incidental bladder diverticulum on the right posterolaterally on ultrasound 6/29/2024    Clinically Significant Risk Factors          Follow-ups Needed After Discharge   Follow-up Appointments     Follow-up and recommended labs and tests       Follow up with primary care provider, Mina Carcamo, within 5 to 7   days for hospital follow-up of recent hyperkalemia, impaired kidney   function, low blood counts (WBC, hemoglobin, platelets), recheck labs,   medication review, blood pressure check.  The following labs/tests are   recommended: CBC with platelets, basic profile on day of visit   recommended, ordered and reviewed by primary clinic provider (or   nephrologist).            Unresulted Labs Ordered in the Past 30 Days of this Admission       No orders found for last 31 day(s).        These results will be followed up by primary clinic provider     Discharge Disposition   Discharged to home with family  Condition at discharge: Stable    Hospital Course   Alba Pandya is a 68 year old male with past medical history of essential hypertension, type 2 diabetes mellitus, admitted 6/28/2024 with acute kidney injury and hyperkalemia.  For details, see admission note.    Acute kidney injury, resolved  Hyperkalemia, resolved  Essential hypertension  Admitted observation.  Prior to admission olmesartan/losartan placed on hold with IV fluids with added serum bicarbonate ordered.  Renal ultrasound showed no evidence of obstruction with incidental bladder  diverticulum, see report.  Prior to admission reported use of non-steroidal anti-inflammatory drugs (NSAIDs), ibuprofen, with recommendations to discontinue on discharge and stay well-hydrated with close outpatient follow-up with primary clinic provider with recheck labs.  For blood pressure control, continued monitoring is recommended and continue holding of olmesartan/losartan on discharge with outpatient review by primary clinic provider.  Continued amlodipine and carvedilol on discharge as prior to admission with half dose chlorthalidone recommended.    Type 2 diabetes mellitus  History of diabetes mellitus, type II with some fluctuations in blood sugars.  Hemoglobin A1c 5.8%.  Outpatient follow-up with primary clinic provider is recommended with ongoing blood sugar monitoring.    Pancytopenia  In addition, pancytopenia noted, mild, with WBC 3400, hemoglobin 11, and platelets 115,000.  Asymptomatic without evidence of bleeding.  Recommended to patient and family on discharge close follow-up with outpatient clinic provider within 1 week with recheck CBC and platelets to reassess cytopenias.  They agree.  Would recommend primary clinic provider consider referral to hematology if persistent abnormalities at outpatient follow-up.  Avoidance of non-steroidal anti-inflammatory drugs (NSAIDs) again discussed.    Patient will call or seek medical attention if any worrisome signs or symptoms develop after discharge.  Patient agrees with plan as outlined and will follow up as recommended and outlined in the dismissal summary.  Care plan reviewed with patient, and family at the bedside and all in agreement.    Consultations This Hospital Stay   None    Code Status   Full Code    Time Spent on this Encounter   I, Swapnil Leger MD, personally saw the patient today and spent greater than 30 minutes discharging this patient.       Swapnil Leger MD  [facility] EXTENDED RECOVERY AND SHORT STAY  [id number]  [facility] [address]  Phone: [phone]  ______________________________________________________________________    Physical Exam   Vital Signs: Temp: 97.8  F (36.6  C) Temp src: Oral BP: 128/69 Pulse: 62   Resp: 15 SpO2: 99 % O2 Device: None (Room air)    Weight: 149 lbs 4.02 oz    GENERAL awake and alert, no acute distress, pleasant and in good spirits and feels ready for discharge home, family the bedside  LUNGS no wheezes or crackles  HEART S1, S2 with RRR  ABDOMEN soft, nontender  SKIN warm and dry  NEURO moves upper and lower extremities spontaneously and to command  MENTAL STATUS answering questions and following simple commands       Primary Care Physician   [facility]    Discharge Orders      Reason for your hospital stay    Impaired kidney function (elevated blood creatinine) and high blood potassium.  IV fluids during hospital stay with improvement in labs.  Follow-up with primary clinic provider with recheck labs within 5 to 7 days recommended.  Avoid non-steroidal anti-inflammatory drugs (NSAIDs) as discussed and hold losartan until follow-up with primary clinic provider or nephrology.     Follow-up and recommended labs and tests     Follow up with primary care provider, [facility], within 5 to 7 days for hospital follow-up of recent hyperkalemia, impaired kidney function, low blood counts (WBC, hemoglobin, platelets), recheck labs, medication review, blood pressure check.  The following labs/tests are recommended: CBC with platelets, basic profile on day of visit recommended, ordered and reviewed by primary clinic provider (or nephrologist).     Activity    Your activity upon discharge: light activity, advance slowly as tolerated     Diet    Follow this diet upon discharge: Orders Placed This Encounter      3 Gram Potassium (low potassium) Diet       Significant Results and Procedures   Most Recent 3 CBC's:  Recent Labs   Lab Test 06/29/24  0612 06/28/24  5500  01/02/23  1129   WBC 3.4* 3.7* 4.6   HGB 11.0* 10.9* 11.1*   MCV 87 88 91   * 126* 151     Most Recent 3 BMP's:  Recent Labs   Lab Test 06/29/24  1101 06/29/24  0612 06/29/24  0202 06/28/24 2216 06/28/24 2212 06/28/24 2028 06/28/24 2014 06/28/24 1822 06/28/24 1822 01/02/23  1529 01/02/23  1129   NA  --  139  --   --   --   --   --   --  137  --  138   POTASSIUM  --  5.0  --   --  4.8  --  4.8   < > 6.2*  --  4.4   CHLORIDE  --  108*  --   --   --   --   --   --  107  --  108   CO2  --  21*  --   --   --   --   --   --  20*  --  22   BUN  --  34.5*  --   --   --   --   --   --  58.0*  --  25   CR  --  0.83  --   --   --   --   --   --  1.79* 0.95 1.24   ANIONGAP  --  10  --   --   --   --   --   --  10  --  8   VINAY  --  9.0  --   --   --   --   --   --  9.1  --  8.5   * 97 93   < >  --    < >  --   --  121*  --  180*    < > = values in this interval not displayed.     Most Recent 2 LFT's:  Recent Labs   Lab Test 06/28/24 1822 01/02/23  1129   AST 13 13   ALT 10 13   ALKPHOS 66 44   BILITOTAL <0.2 0.4   ,   Results for orders placed or performed during the hospital encounter of 06/28/24   US Renal Complete Non-Vascular    Narrative    EXAM: US RENAL COMPLETE NON-VASCULAR  LOCATION: [facility]  DATE: 6/29/2024    INDICATION: Acute renal insufficiency.  COMPARISON: None.  TECHNIQUE: Routine Bilateral Renal and Bladder Ultrasound.    FINDINGS:    RIGHT KIDNEY: 11.0 x 6.0 x 6.0 cm. No stones, masses or hydronephrosis. Normal parenchymal thickness and echogenicity.     LEFT KIDNEY: 11.1 x 6.4 x 6.7 cm. No stones, masses or hydronephrosis. Normal parenchymal thickness and echogenicity.     BLADDER: Bladder diverticulum on the right posterolaterally. No dependent debris or stone material. Ureteral jets not seen during sonogram.      Impression    IMPRESSION:  1.  Both kidneys are normal without stones, masses or hydronephrosis.    2.  Bladder diverticulum on the right  posterolaterally.       Discharge Medications   Current Discharge Medication List        START taking these medications    Details   acetaminophen (TYLENOL) 325 MG tablet Take 2 tablets (650 mg) by mouth every 6 hours as needed for mild pain or other (and adjunct with moderate or severe pain or per patient request)    Associated Diagnoses: Pain           CONTINUE these medications which have CHANGED    Details   chlorthalidone (HYGROTON) 25 MG tablet Take 0.5 tablets (12.5 mg) by mouth daily Note decrease in dose compared to prior to admission; monitor kidney function, electrolytes, and blood pressure closely, review with primary clinic provider at upcoming visit    Associated Diagnoses: Essential hypertension      losartan (COZAAR) 100 MG tablet Take 1 tablet (100 mg) by mouth See Admin Instructions HOLD for now as done in hospital; monitor blood pressure and recheck kidney function labs with primary clinic provider this week as discussed    Associated Diagnoses: Essential hypertension           CONTINUE these medications which have NOT CHANGED    Details   amLODIPine (NORVASC) 10 MG tablet Take 10 mg by mouth daily      ammonium lactate (LAC-HYDRIN) 12 % external lotion Apply topically 2 times daily as needed for dry skin      carvedilol (COREG) 25 MG tablet Take 25 mg by mouth 2 times daily (with meals)      ketoconazole (NIZORAL) 2 % external cream Apply topically 2 times daily as needed for itching      metFORMIN (GLUCOPHAGE) 1000 MG tablet Take 1,000 mg by mouth 2 times daily (with meals)      sennosides (SENOKOT) 8.6 MG tablet Take 2 tablets by mouth Six days a week      simvastatin (ZOCOR) 20 MG tablet Take 20 mg by mouth daily      triamcinolone (KENALOG) 0.1 % external cream Apply topically 2 times daily as needed for irritation           Allergies   No Known Allergies

## 2024-06-29 NOTE — ED NOTES
Below Text message sent to the admitting provider.    Pt in Ed room 1 BG 42, D50 given.  Repeat , repeat potassium = 4.8. Pt asymptomatic.  Per the ED Pharmacist she wants  the Sodium Bicarb held.  Pt bed is ready please advise

## 2024-06-29 NOTE — PLAN OF CARE
RECEIVING UNIT ED HANDOFF REVIEW    ED Nurse Handoff Report was reviewed by: Fitz Varela RN on June 28, 2024 at 9:31 PM

## 2024-06-29 NOTE — PLAN OF CARE
Goal Outcome Evaluation:  PRIMARY Concern: ELIZABETH, Hyperkalemia   SAFETY RISK Concerns (fall risk, behaviors, etc.): Falls      Isolation/Type: None  Tests/Procedures for NEXT shift: AM labs, BG checks  Consults? (Pending/following, signed-off?) Hospitalist following  Where is patient from? (Home, TCU, etc.): Home  Other Important info for NEXT shift: Son at bedside  Anticipated DC date & active delays: Possibly 6/29  ___________________________________________  SUMMARY NOTE:  Orientation/Cognitive: A&Ox4  Observation Goals (Met/ Not Met): Not met  Mobility Level/Assist Equipment: SBA w/ IV pole  Antibiotics & Plan (IV/po, length of tx left): None  Pain Management: Denies pain  Tele/VS/O2: VSS on RA  ABNL Lab/BG: >195>118>93, K+ 4.8, US resulted  Diet: 2g K+ diet  Bowel/Bladder: Cont, voiding adequately, up to bathroom  Skin Concerns: WDL  Drains/Devices: PIV R hand SL, PIV L AC inf sodium bicarb @ 100 mL/hr  Patient Stated Goal for Today: go home

## 2024-06-29 NOTE — PROGRESS NOTES
"PRIMARY DIAGNOSIS: \"GENERIC\" NURSING/HYPERKALEMIA  OUTPATIENT/OBSERVATION GOALS TO BE MET BEFORE DISCHARGE:  ADLs back to baseline: Yes    Activity and level of assistance: Up with standby assistance.    Pain status: Pain free.    Return to near baseline physical activity: Yes     Discharge Planner Nurse   Safe discharge environment identified: No  Barriers to discharge: Yes       Entered by: Mena Hong RN 06/29/2024 7:32 AM     Please review provider order for any additional goals.   Nurse to notify provider when observation goals have been met and patient is ready for discharge.  "

## 2024-06-29 NOTE — PROGRESS NOTES
"PRIMARY DIAGNOSIS: \"GENERIC\" NURSING/HYPERKALEMIA  OUTPATIENT/OBSERVATION GOALS TO BE MET BEFORE DISCHARGE:  ADLs back to baseline: Yes    Activity and level of assistance: Up with standby assistance.    Pain status: Pain free.    Return to near baseline physical activity: Yes     Discharge Planner Nurse   Safe discharge environment identified: No  Barriers to discharge: Yes       Entered by: Nadia Rees RN 06/29/2024 2:04 AM     Please review provider order for any additional goals.   Nurse to notify provider when observation goals have been met and patient is ready for discharge.  "

## 2024-07-05 ENCOUNTER — OFFICE VISIT (OUTPATIENT)
Dept: FAMILY MEDICINE | Facility: CLINIC | Age: 69
End: 2024-07-05
Payer: COMMERCIAL

## 2024-07-05 ENCOUNTER — PATIENT OUTREACH (OUTPATIENT)
Dept: ONCOLOGY | Facility: CLINIC | Age: 69
End: 2024-07-05

## 2024-07-05 VITALS
DIASTOLIC BLOOD PRESSURE: 77 MMHG | BODY MASS INDEX: 24.99 KG/M2 | HEART RATE: 68 BPM | RESPIRATION RATE: 17 BRPM | HEIGHT: 65 IN | TEMPERATURE: 96.9 F | SYSTOLIC BLOOD PRESSURE: 155 MMHG | OXYGEN SATURATION: 99 % | WEIGHT: 150 LBS

## 2024-07-05 DIAGNOSIS — E87.5 HYPERKALEMIA: Primary | ICD-10-CM

## 2024-07-05 DIAGNOSIS — E11.9 TYPE 2 DIABETES MELLITUS WITHOUT COMPLICATION, WITHOUT LONG-TERM CURRENT USE OF INSULIN (H): ICD-10-CM

## 2024-07-05 DIAGNOSIS — I10 ESSENTIAL HYPERTENSION: ICD-10-CM

## 2024-07-05 DIAGNOSIS — D61.818 PANCYTOPENIA (H): ICD-10-CM

## 2024-07-05 DIAGNOSIS — I10 BENIGN ESSENTIAL HYPERTENSION: ICD-10-CM

## 2024-07-05 LAB
ALBUMIN SERPL BCG-MCNC: 4.7 G/DL (ref 3.5–5.2)
ALP SERPL-CCNC: 61 U/L (ref 40–150)
ALT SERPL W P-5'-P-CCNC: 11 U/L (ref 0–70)
ANION GAP SERPL CALCULATED.3IONS-SCNC: 10 MMOL/L (ref 7–15)
AST SERPL W P-5'-P-CCNC: 18 U/L (ref 0–45)
BILIRUB SERPL-MCNC: 0.2 MG/DL
BUN SERPL-MCNC: 19.6 MG/DL (ref 8–23)
CALCIUM SERPL-MCNC: 9.5 MG/DL (ref 8.8–10.2)
CHLORIDE SERPL-SCNC: 104 MMOL/L (ref 98–107)
CHOLEST SERPL-MCNC: 123 MG/DL
CREAT SERPL-MCNC: 0.8 MG/DL (ref 0.67–1.17)
CREAT UR-MCNC: 83 MG/DL
DEPRECATED HCO3 PLAS-SCNC: 24 MMOL/L (ref 22–29)
EGFRCR SERPLBLD CKD-EPI 2021: >90 ML/MIN/1.73M2
ERYTHROCYTE [DISTWIDTH] IN BLOOD BY AUTOMATED COUNT: 12.5 % (ref 10–15)
FASTING STATUS PATIENT QL REPORTED: NO
FASTING STATUS PATIENT QL REPORTED: NO
GLUCOSE SERPL-MCNC: 169 MG/DL (ref 70–99)
HCT VFR BLD AUTO: 32.5 % (ref 40–53)
HDLC SERPL-MCNC: 37 MG/DL
HGB BLD-MCNC: 11.3 G/DL (ref 13.3–17.7)
LDLC SERPL CALC-MCNC: 61 MG/DL
MCH RBC QN AUTO: 30.7 PG (ref 26.5–33)
MCHC RBC AUTO-ENTMCNC: 34.8 G/DL (ref 31.5–36.5)
MCV RBC AUTO: 88 FL (ref 78–100)
MICROALBUMIN UR-MCNC: <12 MG/L
MICROALBUMIN/CREAT UR: NORMAL MG/G{CREAT}
NONHDLC SERPL-MCNC: 86 MG/DL
PLATELET # BLD AUTO: 102 10E3/UL (ref 150–450)
POTASSIUM SERPL-SCNC: 4.5 MMOL/L (ref 3.4–5.3)
PROT SERPL-MCNC: 7.2 G/DL (ref 6.4–8.3)
RBC # BLD AUTO: 3.68 10E6/UL (ref 4.4–5.9)
SODIUM SERPL-SCNC: 138 MMOL/L (ref 135–145)
TRIGL SERPL-MCNC: 125 MG/DL
TSH SERPL DL<=0.005 MIU/L-ACNC: 2.58 UIU/ML (ref 0.3–4.2)
WBC # BLD AUTO: 2.9 10E3/UL (ref 4–11)

## 2024-07-05 PROCEDURE — 80061 LIPID PANEL: CPT | Performed by: FAMILY MEDICINE

## 2024-07-05 PROCEDURE — 85027 COMPLETE CBC AUTOMATED: CPT | Performed by: FAMILY MEDICINE

## 2024-07-05 PROCEDURE — 82043 UR ALBUMIN QUANTITATIVE: CPT | Performed by: FAMILY MEDICINE

## 2024-07-05 PROCEDURE — 36415 COLL VENOUS BLD VENIPUNCTURE: CPT | Performed by: FAMILY MEDICINE

## 2024-07-05 PROCEDURE — 82570 ASSAY OF URINE CREATININE: CPT | Performed by: FAMILY MEDICINE

## 2024-07-05 PROCEDURE — 80053 COMPREHEN METABOLIC PANEL: CPT | Performed by: FAMILY MEDICINE

## 2024-07-05 PROCEDURE — 99204 OFFICE O/P NEW MOD 45 MIN: CPT | Performed by: FAMILY MEDICINE

## 2024-07-05 PROCEDURE — 84443 ASSAY THYROID STIM HORMONE: CPT | Performed by: FAMILY MEDICINE

## 2024-07-05 RX ORDER — LOSARTAN POTASSIUM 100 MG/1
50 TABLET ORAL SEE ADMIN INSTRUCTIONS
Status: SHIPPED
Start: 2024-07-05

## 2024-07-05 ASSESSMENT — PAIN SCALES - GENERAL: PAINLEVEL: NO PAIN (0)

## 2024-07-05 NOTE — PROGRESS NOTES
New Patient Oncology Nurse Navigator Note     Referral Received: 07/05/24      Referring provider: Young More MD     Referring Clinic/Organization: [facility]     Referred to: Benign Hematology    Requested provider (if applicable): First available - did not specify      Evaluation for :   Diagnosis   D61.818 (ICD-10-CM) - Pancytopenia (H)      Clinical History (per Nurse review of records provided):       Latest Reference Range & Units 06/29/24 06:12 06/29/24 06:19 06/29/24 11:01 07/05/24 07:25   Sodium 135 - 145 mmol/L 139      Potassium 3.4 - 5.3 mmol/L 5.0      Chloride 98 - 107 mmol/L 108 (H)      Carbon Dioxide (CO2) 22 - 29 mmol/L 21 (L)      Urea Nitrogen 8.0 - 23.0 mg/dL 34.5 (H)      Creatinine 0.67 - 1.17 mg/dL 0.83      GFR Estimate >60 mL/min/1.73m2 >90      Calcium 8.8 - 10.2 mg/dL 9.0      Anion Gap 7 - 15 mmol/L 10      Glucose 70 - 99 mg/dL 97      Hemoglobin A1C <5.7 % 5.8 (H)      GLUCOSE BY METER POCT 70 - 99 mg/dL   171 (H)    WBC 4.0 - 11.0 10e3/uL 3.4 (L)   2.9 (L)   Hemoglobin 13.3 - 17.7 g/dL 11.0 (L)   11.3 (L)   Hematocrit 40.0 - 53.0 % 32.0 (L)   32.5 (L)   Platelet Count 150 - 450 10e3/uL 115 (L)   102 (L)   RBC Count 4.40 - 5.90 10e6/uL 3.67 (L)   3.68 (L)   MCV 78 - 100 fL 87   88   MCH 26.5 - 33.0 pg 30.0   30.7   MCHC 31.5 - 36.5 g/dL 34.4   34.8   RDW 10.0 - 15.0 % 12.6   12.5   % Neutrophils % 60      % Lymphocytes % 27      % Monocytes % 7      % Eosinophils % 5      % Basophils % 1      Absolute Basophils 0.0 - 0.2 10e3/uL 0.0      Absolute Eosinophils 0.0 - 0.7 10e3/uL 0.2      Absolute Immature Granulocytes <=0.4 10e3/uL 0.0      Absolute Lymphocytes 0.8 - 5.3 10e3/uL 0.9      Absolute Monocytes 0.0 - 1.3 10e3/uL 0.2      % Immature Granulocytes % 0      Absolute Neutrophils 1.6 - 8.3 10e3/uL 2.0      Absolute NRBCs 10e3/uL 0.0      NRBCs per 100 WBC <1 /100 0      US RENAL COMPLETE NON-VASCULAR   Rpt     (H): Data is abnormally high  (L): Data is abnormally low  Rpt:  View report in Results Review for more information1    Records Location: [facility]     Records Needed:     ?    Additional testing needed prior to consult:     ?    Referral updates and Plan:     07/05/2024 11:54 AM -  Referral received and reviewed.   Called pt at this time to review referral.  Note indicates they may want to go to [facility].  Waiting to hear back from pt.     07/10/2024 9:50 AM -  Daughter left message stating ready to be scheduled.  Called her at this time and LM with my contact information.     07/10/2024 11:17 AM -  Spoke with Michael and she states pt would like to see someone here if we are able to get him in with a month.  Able to hold a spot with Dr. Giovany De La Vega on 7/15 for 30 minutes. Transferred to NPS to schedule.       Bridget Paulino, RENÉN, RN  Hematology/Oncology Nurse Navigator  [facility]  [phone] / [phone]

## 2024-07-05 NOTE — PROGRESS NOTES
Assessment & Plan     Hyperkalemia  Patient was admitted in the hospital due to acute kidney injury and hyperkalemia most likely due to medication versus dehydration.  Will monitor his kidney function he has appointment coming up with nephrology to adjust his medication advised to keep that appointment.  His potassium is stabilizing due to elevated blood pressure and recommended them to start half dose of losartan 25 mg until they see nephrologist and they can adjust his medication.  - Comprehensive metabolic panel; Future  - Albumin Random Urine Quantitative with Creat Ratio; Future  - Comprehensive metabolic panel  - Albumin Random Urine Quantitative with Creat Ratio    Type 2 diabetes mellitus without complication, without long-term current use of insulin (H)    - Lipid panel reflex to direct LDL Non-fasting; Future  - Comprehensive metabolic panel; Future  - TSH; Future  - Albumin Random Urine Quantitative with Creat Ratio; Future  - Lipid panel reflex to direct LDL Non-fasting  - Comprehensive metabolic panel  - TSH  - Albumin Random Urine Quantitative with Creat Ratio    Benign essential hypertension      Pancytopenia (H)  Patient is noted to have pancytopenia which is persistent.  He does not have any constitutional symptoms.  It may need further evaluation from hematology referral provided if they would like to see provider in [facility], talking to primary care for referral on that to discuss issues related to pancytopenia.    - CBC with Platelets; Future  - CBC with Platelets  - Adult Oncology/Hematology  Referral; Future    Essential hypertension    - losartan (COZAAR) 100 MG tablet; Take 0.5 tablets (50 mg, stabilizing with kidney functions are stable I would suggest start with 25 mg of losartan if tolerated and labs are stable he can increase his recommendation of nephrologist.        MED REC REQUIRED  Post Medication Reconciliation Status: discharge medications reconciled, continue medications  without change    MEDICATIONS:  Continue current medications without change    Subjective   Alba is a 68 year old, presenting for the following health issues:  Hospital F/U (hyperkalemia) and Establish Care        7/5/2024     6:59 AM   Additional Questions   Roomed by Ivonne FLAHERTY   Accompanied by Daughter- Michael     History of Present Illness       Reason for visit:  Follow up after ER visit and estbalishing care with a provider for further follow-ups.  Symptom onset:  3-7 days ago  Symptoms include:  Hyperklemia and ELIZABETH  Symptom intensity:  Moderate  Symptom progression:  Staying the same  Had these symptoms before:  No    He eats 2-3 servings of fruits and vegetables daily.He consumes 0 sweetened beverage(s) daily.He exercises with enough effort to increase his heart rate 9 or less minutes per day.  He exercises with enough effort to increase his heart rate 3 or less days per week.   He is taking medications regularly.     Patient is pleasant 68-year-old male with well-controlled diabetes underlying hypertension was admitted in the hospital secondary to elevated potassium and elevated creatinine.  Prior to that he was taking blood pressure medication include amlodipine, Coreg as well as ARB.  Also taking chlorthalidone 25 mg which was decreased to 12.5 mg at the discharge of hospital.  He has not started taking ARB since out of hospital.  Apart from that he was noted to have mildly low blood indices including WBC, RBC and platelets.  He denies any blood in the stool.  He was feeling dizzy prior to admission but with hydration he felt stable.  Denies any chest pains no shortness of breath.  Patient is planning to see nephrologist on Monday.  Denies any bilateral leg edema.  No shortness of breath.    Hospital Follow-up Visit:    Hospital/Nursing Home/IP Rehab Facility: [facility]  Date of Admission: 06/28/2024  Date of Discharge: 06/29/2024  Reason(s) for Admission: Elevated potassium  Was  "the patient in the ICU or did the patient experience delirium during hospitalization?  No  Do you have any other stressors you would like to discuss with your provider? No    Problems taking medications regularly:  None  Medication changes since discharge: None  Problems adhering to non-medication therapy:  None    Summary of hospitalization:  [facility] discharge summary reviewed  Diagnostic Tests/Treatments reviewed.  Follow up needed: nephrologist  Other Healthcare Providers Involved in Patient s Care:         None  Update since discharge: improved.     Plan of care communicated with patient         Review of Systems  Constitutional, HEENT, cardiovascular, pulmonary, gi and gu systems are negative, except as otherwise noted.      Objective    BP (!) 155/77   Pulse 68   Temp 96.9  F (36.1  C) (Temporal)   Resp 17   Ht 1.66 m (5' 5.35\")   Wt 68 kg (150 lb)   SpO2 99%   BMI 24.69 kg/m    Body mass index is 24.69 kg/m .  Physical Exam   GENERAL: alert and no distress  NECK: no adenopathy, no asymmetry, masses, or scars  RESP: lungs clear to auscultation - no rales, rhonchi or wheezes  CV: regular rate and rhythm, normal S1 S2, no S3 or S4, no murmur, click or rub, no peripheral edema  ABDOMEN: soft, nontender, no hepatosplenomegaly, no masses and bowel sounds normal  MS: no gross musculoskeletal defects noted, no edema            Signed Electronically by: Young More MD    "

## 2024-07-11 NOTE — TELEPHONE ENCOUNTER
RECORDS STATUS - ALL OTHER DIAGNOSIS      RECORDS RECEIVED FROM: [facility]   NOTES STATUS DETAILS   OFFICE NOTE from referring provider Epic 07/05/24: Dr. Young More   DISCHARGE SUMMARY from hospital [facility] 06/28/24: [facility]   MEDICATION LIST [facility]    LABS     PATHOLOGY REPORTS     ANYTHING RELATED TO DIAGNOSIS Epic Most recent 07/05/24

## 2024-07-15 ENCOUNTER — PRE VISIT (OUTPATIENT)
Dept: ONCOLOGY | Facility: CLINIC | Age: 69
End: 2024-07-15
Payer: COMMERCIAL

## 2024-07-15 ENCOUNTER — ONCOLOGY VISIT (OUTPATIENT)
Dept: ONCOLOGY | Facility: CLINIC | Age: 69
End: 2024-07-15
Attending: FAMILY MEDICINE
Payer: COMMERCIAL

## 2024-07-15 VITALS
OXYGEN SATURATION: 98 % | HEART RATE: 66 BPM | SYSTOLIC BLOOD PRESSURE: 120 MMHG | HEIGHT: 65 IN | TEMPERATURE: 97 F | DIASTOLIC BLOOD PRESSURE: 70 MMHG | RESPIRATION RATE: 16 BRPM | BODY MASS INDEX: 24.83 KG/M2 | WEIGHT: 149 LBS

## 2024-07-15 DIAGNOSIS — D61.818 PANCYTOPENIA (H): ICD-10-CM

## 2024-07-15 LAB
BASOPHILS # BLD AUTO: 0 10E3/UL (ref 0–0.2)
BASOPHILS NFR BLD AUTO: 1 %
EOSINOPHIL # BLD AUTO: 0.3 10E3/UL (ref 0–0.7)
EOSINOPHIL NFR BLD AUTO: 9 %
ERYTHROCYTE [DISTWIDTH] IN BLOOD BY AUTOMATED COUNT: 13.2 % (ref 10–15)
FOLATE SERPL-MCNC: 14.5 NG/ML (ref 4.6–34.8)
HCT VFR BLD AUTO: 32.8 % (ref 40–53)
HGB BLD-MCNC: 11 G/DL (ref 13.3–17.7)
IMM GRANULOCYTES # BLD: 0 10E3/UL
IMM GRANULOCYTES NFR BLD: 0 %
LYMPHOCYTES # BLD AUTO: 0.9 10E3/UL (ref 0.8–5.3)
LYMPHOCYTES NFR BLD AUTO: 30 %
MCH RBC QN AUTO: 29.3 PG (ref 26.5–33)
MCHC RBC AUTO-ENTMCNC: 33.5 G/DL (ref 31.5–36.5)
MCV RBC AUTO: 87 FL (ref 78–100)
MONOCYTES # BLD AUTO: 0.3 10E3/UL (ref 0–1.3)
MONOCYTES NFR BLD AUTO: 9 %
NEUTROPHILS # BLD AUTO: 1.6 10E3/UL (ref 1.6–8.3)
NEUTROPHILS NFR BLD AUTO: 51 %
NRBC # BLD AUTO: 0 10E3/UL
NRBC BLD AUTO-RTO: 0 /100
PLATELET # BLD AUTO: 112 10E3/UL (ref 150–450)
RBC # BLD AUTO: 3.76 10E6/UL (ref 4.4–5.9)
RETICS # AUTO: 0.08 10E6/UL (ref 0.03–0.1)
RETICS/RBC NFR AUTO: 2.2 % (ref 0.5–2)
TSH SERPL DL<=0.005 MIU/L-ACNC: 2.83 UIU/ML (ref 0.3–4.2)
VIT B12 SERPL-MCNC: 306 PG/ML (ref 232–1245)
WBC # BLD AUTO: 3 10E3/UL (ref 4–11)

## 2024-07-15 PROCEDURE — 82746 ASSAY OF FOLIC ACID SERUM: CPT | Performed by: INTERNAL MEDICINE

## 2024-07-15 PROCEDURE — 85045 AUTOMATED RETICULOCYTE COUNT: CPT | Performed by: INTERNAL MEDICINE

## 2024-07-15 PROCEDURE — 99204 OFFICE O/P NEW MOD 45 MIN: CPT | Performed by: INTERNAL MEDICINE

## 2024-07-15 PROCEDURE — 99207 BLOOD MORPHOLOGY PATHOLOGIST REVIEW: CPT | Performed by: PATHOLOGY

## 2024-07-15 PROCEDURE — 82607 VITAMIN B-12: CPT | Performed by: INTERNAL MEDICINE

## 2024-07-15 PROCEDURE — 85048 AUTOMATED LEUKOCYTE COUNT: CPT | Performed by: INTERNAL MEDICINE

## 2024-07-15 PROCEDURE — G0463 HOSPITAL OUTPT CLINIC VISIT: HCPCS | Performed by: INTERNAL MEDICINE

## 2024-07-15 PROCEDURE — 84443 ASSAY THYROID STIM HORMONE: CPT | Performed by: INTERNAL MEDICINE

## 2024-07-15 PROCEDURE — 36415 COLL VENOUS BLD VENIPUNCTURE: CPT

## 2024-07-15 ASSESSMENT — ENCOUNTER SYMPTOMS
ENDOCRINE NEGATIVE: 1
PSYCHIATRIC NEGATIVE: 1
CONSTITUTIONAL NEGATIVE: 1
CARDIOVASCULAR NEGATIVE: 1
NEUROLOGICAL NEGATIVE: 1
MUSCULOSKELETAL NEGATIVE: 1
HEMATOLOGIC/LYMPHATIC NEGATIVE: 1
RESPIRATORY NEGATIVE: 1
GASTROINTESTINAL NEGATIVE: 1
EYES NEGATIVE: 1

## 2024-07-15 ASSESSMENT — PAIN SCALES - GENERAL: PAINLEVEL: NO PAIN (0)

## 2024-07-15 NOTE — PROGRESS NOTES
Medical Assistant Note:  Alba Pandya presents today for blood draw.    Patient seen by provider today: Yes: Dr Camejo.   present during visit today: Not Applicable.    Concerns: No Concerns.    Procedure:  Lab draw site: rt antecub, Needle type: butterfly, Gauge: 23.    Post Assessment:  Labs drawn without difficulty: Yes.    Discharge Plan:  Departure Mode: Ambulatory.    Face to Face Time: 10.    Ashley Lockhart CMA

## 2024-07-15 NOTE — NURSING NOTE
"Oncology Rooming Note    July 15, 2024 12:58 PM   Alba Pandya is a 68 year old male who presents for:    Chief Complaint   Patient presents with    Oncology Clinic Visit     Initial Vitals: /70 (Cuff Size: Adult Regular)   Pulse 66   Temp 97  F (36.1  C) (Temporal)   Resp 16   Ht 1.651 m (5' 5\")   Wt 67.6 kg (149 lb)   SpO2 98%   BMI 24.79 kg/m   Estimated body mass index is 24.79 kg/m  as calculated from the following:    Height as of this encounter: 1.651 m (5' 5\").    Weight as of this encounter: 67.6 kg (149 lb). Body surface area is 1.76 meters squared.  No Pain (0) Comment: Data Unavailable   No LMP for male patient.  Allergies reviewed: Yes  Medications reviewed: Yes    Medications: Medication refills not needed today.  Pharmacy name entered into [employer]: [id number] - [facility] [id number] [place] [facility] [place] [address]    Frailty Screening:   Is the patient here for a new oncology consult visit in cancer care? 2. No      Clinical concerns: new patient       Ashley Lockhart CMA              "

## 2024-07-15 NOTE — LETTER
7/15/2024      Alba Pandya  [address]  [address]  [facility] [address]      Dear Colleague,    Thank you for referring your patient, Alba Pandya, to the [facility]. Please see a copy of my visit note below.    Assessment & Plan  Asymptomatic slightly low white count, borderline anemia and mild thrombocytopenia of several years' duration at least    Peripheral blood morphology pending    History  This is an initial hematology consultation visit for this [place] [employer] worker, referred for cytopenias of unknown duration (at least years).  The patient is with his son Paulie.  He has not seen a hematologist and actually has overall excellent health with no problems related to bleeding, infection, fatigue, weight change, fever or other B symptoms.  Similar lab values are found in records from prior health systems dating back to 2014.        He notes that his weight is down a few pounds since he had colonoscopy done in May 2024.    ECOG = 0    Patient Active Problem List   Diagnosis     Hyperkalemia     Current Outpatient Medications   Medication Sig Dispense Refill     acetaminophen (TYLENOL) 325 MG tablet Take 2 tablets (650 mg) by mouth every 6 hours as needed for mild pain or other (and adjunct with moderate or severe pain or per patient request)       amLODIPine (NORVASC) 10 MG tablet Take 10 mg by mouth daily       ammonium lactate (LAC-HYDRIN) 12 % external lotion Apply topically 2 times daily as needed for dry skin       carvedilol (COREG) 25 MG tablet Take 25 mg by mouth 2 times daily (with meals)       chlorthalidone (HYGROTON) 25 MG tablet Take 0.5 tablets (12.5 mg) by mouth daily Note decrease in dose compared to prior to admission; monitor kidney function, electrolytes, and blood pressure closely, review with primary clinic provider at upcoming visit       ketoconazole (NIZORAL) 2 % external cream Apply topically 2 times daily as needed for itching  "      losartan (COZAAR) 100 MG tablet Take 0.5 tablets (50 mg) by mouth See Admin Instructions HOLD for now as done in hospital; monitor blood pressure and recheck kidney function labs with primary clinic provider this week as discussed       metFORMIN (GLUCOPHAGE) 1000 MG tablet Take 1,000 mg by mouth 2 times daily (with meals)       sennosides (SENOKOT) 8.6 MG tablet Take 2 tablets by mouth Six days a week       simvastatin (ZOCOR) 20 MG tablet Take 20 mg by mouth daily       triamcinolone (KENALOG) 0.1 % external cream Apply topically 2 times daily as needed for irritation       No current facility-administered medications for this visit.     Past Medical History:   Diagnosis Date     Benign essential hypertension      No past surgical history on file.  Socioeconomic History     Marital status: Legally      Social Determinants of Health      Received from [facility] [employer] [facility]    Social [employer] - [employer] Fees     Review of Systems   Constitutional: Negative.    HENT:  Negative.     Eyes: Negative.    Respiratory: Negative.     Cardiovascular: Negative.    Gastrointestinal: Negative.    Endocrine: Negative.    Genitourinary: Negative.     Musculoskeletal: Negative.    Skin: Negative.    Neurological: Negative.    Hematological: Negative.    Psychiatric/Behavioral: Negative.     All other systems reviewed and are negative.      /70 (Cuff Size: Adult Regular)   Pulse 66   Temp 97  F (36.1  C) (Temporal)   Resp 16   Ht 1.651 m (5' 5\")   Wt 67.6 kg (149 lb)   SpO2 98%   BMI 24.79 kg/m      Physical Exam  Vitals and nursing note reviewed. Exam conducted with a chaperone present.   Constitutional:       Appearance: Normal appearance. He is well-developed, well-groomed and normal weight.      Comments: Cheerful, fit appearing black male   HENT:      Head: Normocephalic and atraumatic.      Mouth/Throat:      Mouth: Mucous membranes are dry.      Dentition: Dental caries present.   Eyes: "      Extraocular Movements: Extraocular movements intact.      Conjunctiva/sclera: Conjunctivae normal.      Pupils: Pupils are equal, round, and reactive to light.   Cardiovascular:      Rate and Rhythm: Normal rate and regular rhythm.      Pulses: Normal pulses.      Heart sounds: Normal heart sounds.   Pulmonary:      Effort: Pulmonary effort is normal.      Breath sounds: Normal breath sounds.   Abdominal:      General: Abdomen is flat. There is no distension.      Palpations: Abdomen is soft. There is no mass.      Tenderness: There is no abdominal tenderness. There is no guarding.   Musculoskeletal:         General: No swelling or deformity.      Cervical back: Normal range of motion and neck supple.   Lymphadenopathy:      Cervical: No cervical adenopathy.      Upper Body:      Right upper body: No axillary or epitrochlear adenopathy.      Left upper body: No axillary or epitrochlear adenopathy.   Skin:     General: Skin is warm and dry.   Neurological:      General: No focal deficit present.      Mental Status: He is alert and oriented to person, place, and time.      Cranial Nerves: No cranial nerve deficit.      Motor: No weakness.      Gait: Gait normal.      Deep Tendon Reflexes: Reflexes normal.   Psychiatric:         Mood and Affect: Mood normal.         Behavior: Behavior normal. Behavior is cooperative.         Thought Content: Thought content normal.         Judgment: Judgment normal.         Recent Results (from the past 720 hour(s))   Comprehensive metabolic panel    Collection Time: 06/28/24  6:22 PM   Result Value Ref Range    Sodium 137 135 - 145 mmol/L    Potassium 6.2 (HH) 3.4 - 5.3 mmol/L    Carbon Dioxide (CO2) 20 (L) 22 - 29 mmol/L    Anion Gap 10 7 - 15 mmol/L    Urea Nitrogen 58.0 (H) 8.0 - 23.0 mg/dL    Creatinine 1.79 (H) 0.67 - 1.17 mg/dL    GFR Estimate 41 (L) >60 mL/min/1.73m2    Calcium 9.1 8.8 - 10.2 mg/dL    Chloride 107 98 - 107 mmol/L    Glucose 121 (H) 70 - 99 mg/dL     Alkaline Phosphatase 66 40 - 150 U/L    AST 13 0 - 45 U/L    ALT 10 0 - 70 U/L    Protein Total 7.2 6.4 - 8.3 g/dL    Albumin 4.6 3.5 - 5.2 g/dL    Bilirubin Total <0.2 <=1.2 mg/dL   Troponin T, High Sensitivity    Collection Time: 06/28/24  6:22 PM   Result Value Ref Range    Troponin T, High Sensitivity 19 <=22 ng/L   CBC with platelets and differential    Collection Time: 06/28/24  6:22 PM   Result Value Ref Range    WBC Count 3.7 (L) 4.0 - 11.0 10e3/uL    RBC Count 3.61 (L) 4.40 - 5.90 10e6/uL    Hemoglobin 10.9 (L) 13.3 - 17.7 g/dL    Hematocrit 31.8 (L) 40.0 - 53.0 %    MCV 88 78 - 100 fL    MCH 30.2 26.5 - 33.0 pg    MCHC 34.3 31.5 - 36.5 g/dL    RDW 12.7 10.0 - 15.0 %    Platelet Count 126 (L) 150 - 450 10e3/uL    % Neutrophils 56 %    % Lymphocytes 31 %    % Monocytes 6 %    % Eosinophils 6 %    % Basophils 1 %    % Immature Granulocytes 0 %    NRBCs per 100 WBC 0 <1 /100    Absolute Neutrophils 2.1 1.6 - 8.3 10e3/uL    Absolute Lymphocytes 1.2 0.8 - 5.3 10e3/uL    Absolute Monocytes 0.2 0.0 - 1.3 10e3/uL    Absolute Eosinophils 0.2 0.0 - 0.7 10e3/uL    Absolute Basophils 0.0 0.0 - 0.2 10e3/uL    Absolute Immature Granulocytes 0.0 <=0.4 10e3/uL    Absolute NRBCs 0.0 10e3/uL   EKG 12 lead    Collection Time: 06/28/24  7:06 PM   Result Value Ref Range    Systolic Blood Pressure  mmHg    Diastolic Blood Pressure  mmHg    Ventricular Rate 65 BPM    Atrial Rate 65 BPM    HI Interval 178 ms    QRS Duration 96 ms     ms    QTc 386 ms    P Axis 63 degrees    R AXIS 48 degrees    T Axis 63 degrees    Interpretation ECG       Sinus rhythm  Normal ECG  When compared with ECG of 02-JAN-2023 11:03,  T wave inversion no longer evident in Inferior leads  Confirmed by GENERATED REPORT, COMPUTER ([id number]),  Dat Savage ([id number]) on 6/28/2024 7:09:48 PM     Potassium    Collection Time: 06/28/24  8:14 PM   Result Value Ref Range    Potassium 4.8 3.4 - 5.3 mmol/L   Glucose by meter    Collection Time: 06/28/24   8:28 PM   Result Value Ref Range    GLUCOSE BY METER POCT 100 (H) 70 - 99 mg/dL   Glucose by meter    Collection Time: 06/28/24  8:53 PM   Result Value Ref Range    GLUCOSE BY METER POCT 43 (LL) 70 - 99 mg/dL   Glucose by meter    Collection Time: 06/28/24  9:18 PM   Result Value Ref Range    GLUCOSE BY METER POCT 142 (H) 70 - 99 mg/dL   Glucose by meter    Collection Time: 06/28/24  9:38 PM   Result Value Ref Range    GLUCOSE BY METER POCT 110 (H) 70 - 99 mg/dL   Potassium    Collection Time: 06/28/24 10:12 PM   Result Value Ref Range    Potassium 4.8 3.4 - 5.3 mmol/L   Glucose by meter    Collection Time: 06/28/24 10:16 PM   Result Value Ref Range    GLUCOSE BY METER POCT 86 70 - 99 mg/dL   Glucose by meter    Collection Time: 06/28/24 11:15 PM   Result Value Ref Range    GLUCOSE BY METER POCT 172 (H) 70 - 99 mg/dL   Glucose by meter    Collection Time: 06/28/24 11:51 PM   Result Value Ref Range    GLUCOSE BY METER POCT 195 (H) 70 - 99 mg/dL   Glucose by meter    Collection Time: 06/29/24  1:17 AM   Result Value Ref Range    GLUCOSE BY METER POCT 118 (H) 70 - 99 mg/dL   Glucose by meter    Collection Time: 06/29/24  2:02 AM   Result Value Ref Range    GLUCOSE BY METER POCT 93 70 - 99 mg/dL   Hemoglobin A1c    Collection Time: 06/29/24  6:12 AM   Result Value Ref Range    Hemoglobin A1C 5.8 (H) <5.7 %   Basic metabolic panel    Collection Time: 06/29/24  6:12 AM   Result Value Ref Range    Sodium 139 135 - 145 mmol/L    Potassium 5.0 3.4 - 5.3 mmol/L    Chloride 108 (H) 98 - 107 mmol/L    Carbon Dioxide (CO2) 21 (L) 22 - 29 mmol/L    Anion Gap 10 7 - 15 mmol/L    Urea Nitrogen 34.5 (H) 8.0 - 23.0 mg/dL    Creatinine 0.83 0.67 - 1.17 mg/dL    GFR Estimate >90 >60 mL/min/1.73m2    Calcium 9.0 8.8 - 10.2 mg/dL    Glucose 97 70 - 99 mg/dL   CBC with platelets and differential    Collection Time: 06/29/24  6:12 AM   Result Value Ref Range    WBC Count 3.4 (L) 4.0 - 11.0 10e3/uL    RBC Count 3.67 (L) 4.40 - 5.90 10e6/uL     Hemoglobin 11.0 (L) 13.3 - 17.7 g/dL    Hematocrit 32.0 (L) 40.0 - 53.0 %    MCV 87 78 - 100 fL    MCH 30.0 26.5 - 33.0 pg    MCHC 34.4 31.5 - 36.5 g/dL    RDW 12.6 10.0 - 15.0 %    Platelet Count 115 (L) 150 - 450 10e3/uL    % Neutrophils 60 %    % Lymphocytes 27 %    % Monocytes 7 %    % Eosinophils 5 %    % Basophils 1 %    % Immature Granulocytes 0 %    NRBCs per 100 WBC 0 <1 /100    Absolute Neutrophils 2.0 1.6 - 8.3 10e3/uL    Absolute Lymphocytes 0.9 0.8 - 5.3 10e3/uL    Absolute Monocytes 0.2 0.0 - 1.3 10e3/uL    Absolute Eosinophils 0.2 0.0 - 0.7 10e3/uL    Absolute Basophils 0.0 0.0 - 0.2 10e3/uL    Absolute Immature Granulocytes 0.0 <=0.4 10e3/uL    Absolute NRBCs 0.0 10e3/uL   Glucose by meter    Collection Time: 06/29/24 11:01 AM   Result Value Ref Range    GLUCOSE BY METER POCT 171 (H) 70 - 99 mg/dL   Lipid panel reflex to direct LDL Non-fasting    Collection Time: 07/05/24  7:25 AM   Result Value Ref Range    Cholesterol 123 <200 mg/dL    Triglycerides 125 <150 mg/dL    Direct Measure HDL 37 (L) >=40 mg/dL    LDL Cholesterol Calculated 61 <=100 mg/dL    Non HDL Cholesterol 86 <130 mg/dL    Patient Fasting > 8hrs? No    Comprehensive metabolic panel    Collection Time: 07/05/24  7:25 AM   Result Value Ref Range    Sodium 138 135 - 145 mmol/L    Potassium 4.5 3.4 - 5.3 mmol/L    Carbon Dioxide (CO2) 24 22 - 29 mmol/L    Anion Gap 10 7 - 15 mmol/L    Urea Nitrogen 19.6 8.0 - 23.0 mg/dL    Creatinine 0.80 0.67 - 1.17 mg/dL    GFR Estimate >90 >60 mL/min/1.73m2    Calcium 9.5 8.8 - 10.2 mg/dL    Chloride 104 98 - 107 mmol/L    Glucose 169 (H) 70 - 99 mg/dL    Alkaline Phosphatase 61 40 - 150 U/L    AST 18 0 - 45 U/L    ALT 11 0 - 70 U/L    Protein Total 7.2 6.4 - 8.3 g/dL    Albumin 4.7 3.5 - 5.2 g/dL    Bilirubin Total 0.2 <=1.2 mg/dL    Patient Fasting > 8hrs? No    CBC with Platelets    Collection Time: 07/05/24  7:25 AM   Result Value Ref Range    WBC Count 2.9 (L) 4.0 - 11.0 10e3/uL    RBC Count  3.68 (L) 4.40 - 5.90 10e6/uL    Hemoglobin 11.3 (L) 13.3 - 17.7 g/dL    Hematocrit 32.5 (L) 40.0 - 53.0 %    MCV 88 78 - 100 fL    MCH 30.7 26.5 - 33.0 pg    MCHC 34.8 31.5 - 36.5 g/dL    RDW 12.5 10.0 - 15.0 %    Platelet Count 102 (L) 150 - 450 10e3/uL   TSH    Collection Time: 07/05/24  7:25 AM   Result Value Ref Range    TSH 2.58 0.30 - 4.20 uIU/mL   Albumin Random Urine Quantitative with Creat Ratio    Collection Time: 07/05/24  7:27 AM   Result Value Ref Range    Creatinine Urine mg/dL 83.0 mg/dL    Albumin Urine mg/L <12.0 mg/L    Albumin Urine mg/g Cr     TSH with free T4 reflex    Collection Time: 07/15/24  1:50 PM   Result Value Ref Range    TSH 2.83 0.30 - 4.20 uIU/mL   Vitamin B12    Collection Time: 07/15/24  1:50 PM   Result Value Ref Range    Vitamin B12 306 232 - 1,245 pg/mL   Folate    Collection Time: 07/15/24  1:50 PM   Result Value Ref Range    Folic Acid 14.5 4.6 - 34.8 ng/mL   CBC with platelets and differential    Collection Time: 07/15/24  1:50 PM   Result Value Ref Range    WBC Count 3.0 (L) 4.0 - 11.0 10e3/uL    RBC Count 3.76 (L) 4.40 - 5.90 10e6/uL    Hemoglobin 11.0 (L) 13.3 - 17.7 g/dL    Hematocrit 32.8 (L) 40.0 - 53.0 %    MCV 87 78 - 100 fL    MCH 29.3 26.5 - 33.0 pg    MCHC 33.5 31.5 - 36.5 g/dL    RDW 13.2 10.0 - 15.0 %    Platelet Count 112 (L) 150 - 450 10e3/uL    % Neutrophils 51 %    % Lymphocytes 30 %    % Monocytes 9 %    % Eosinophils 9 %    % Basophils 1 %    % Immature Granulocytes 0 %    NRBCs per 100 WBC 0 <1 /100    Absolute Neutrophils 1.6 1.6 - 8.3 10e3/uL    Absolute Lymphocytes 0.9 0.8 - 5.3 10e3/uL    Absolute Monocytes 0.3 0.0 - 1.3 10e3/uL    Absolute Eosinophils 0.3 0.0 - 0.7 10e3/uL    Absolute Basophils 0.0 0.0 - 0.2 10e3/uL    Absolute Immature Granulocytes 0.0 <=0.4 10e3/uL    Absolute NRBCs 0.0 10e3/uL   Reticulocyte count    Collection Time: 07/15/24  1:50 PM   Result Value Ref Range    % Reticulocyte 2.2 (H) 0.5 - 2.0 %    Absolute Reticulocyte 0.082  0.025 - 0.095 10e6/uL     Recent Results (from the past 744 hour(s))   US Renal Complete Non-Vascular    Narrative    EXAM: US RENAL COMPLETE NON-VASCULAR  LOCATION: [facility]  DATE: 6/29/2024    INDICATION: Acute renal insufficiency.  COMPARISON: None.  TECHNIQUE: Routine Bilateral Renal and Bladder Ultrasound.    FINDINGS:    RIGHT KIDNEY: 11.0 x 6.0 x 6.0 cm. No stones, masses or hydronephrosis. Normal parenchymal thickness and echogenicity.     LEFT KIDNEY: 11.1 x 6.4 x 6.7 cm. No stones, masses or hydronephrosis. Normal parenchymal thickness and echogenicity.     BLADDER: Bladder diverticulum on the right posterolaterally. No dependent debris or stone material. Ureteral jets not seen during sonogram.      Impression    IMPRESSION:  1.  Both kidneys are normal without stones, masses or hydronephrosis.    2.  Bladder diverticulum on the right posterolaterally.   US Renal Artery Bilateral    Narrative    For Patients:  As a result of the 21st Century Cures Act, medical imaging exams and procedure reports are released immediately into your electronic medical record.  You may view this report before your referring provider.  If you have questions, please contact your health care provider.      Examination:  Renal Doppler Ultrasound    Indication:  Hypertension     Technique:  Ultrasound images of the kidneys and associated vascular structures are obtained. Ultrasound performed using real-time gray scale imaging (B-mode 2D), color-flow Doppler and spectral analysis.     Comparison:  None available     Findings:  The right kidney measures 11.0 cm and the left kidney measures 11.2 cm. No hydronephrosis. Normal cortical thickness and echogenicity. No renal mass or stone is identified within the limits of ultrasound.    The urinary bladder is unremarkable.    Aortic velocity is 122 cm/sec.  Right renal artery velocity is 95 cm/sec (renal/aortic ratio 0.8).  Left renal artery velocity is 146  cm/sec (renal/aortic ratio 1.2).    There is no spectral broadening or parvus et tardus waveform in either of the renal arteries. The arcuate artery resistive indices and segmental artery acceleration indices are within normal limits in the kidneys bilaterally.     Impression:  Normal exam. No sonographic evidence of renal artery stenosis.        Dictated by Ramón Hernandez MD @ 7/13/2024 4:59:25 PM    (Electronically Signed)   Again, thank you for allowing me to participate in the care of your patient.        Sincerely,        Shanta Adair MD

## 2024-07-16 LAB
PATH REPORT.COMMENTS IMP SPEC: NORMAL
PATH REPORT.FINAL DX SPEC: NORMAL
PATH REPORT.MICROSCOPIC SPEC OTHER STN: NORMAL
PATH REPORT.MICROSCOPIC SPEC OTHER STN: NORMAL

## 2024-07-16 NOTE — PROGRESS NOTES
Assessment & Plan   Asymptomatic slightly low white count, borderline anemia and mild thrombocytopenia of several years' duration at least    Peripheral blood morphology pending    History  This is an initial hematology consultation visit for this [employer] [place] [employer] worker, referred for cytopenias of unknown duration (at least years).  The patient is with his son Paulie.  He has not seen a hematologist and actually has overall excellent health with no problems related to bleeding, infection, fatigue, weight change, fever or other B symptoms.  Similar lab values are found in records from prior health systems dating back to 2014.        He notes that his weight is down a few pounds since he had colonoscopy done in May 2024.    ECOG = 0    Patient Active Problem List   Diagnosis    Hyperkalemia     Current Outpatient Medications   Medication Sig Dispense Refill    acetaminophen (TYLENOL) 325 MG tablet Take 2 tablets (650 mg) by mouth every 6 hours as needed for mild pain or other (and adjunct with moderate or severe pain or per patient request)      amLODIPine (NORVASC) 10 MG tablet Take 10 mg by mouth daily      ammonium lactate (LAC-HYDRIN) 12 % external lotion Apply topically 2 times daily as needed for dry skin      carvedilol (COREG) 25 MG tablet Take 25 mg by mouth 2 times daily (with meals)      chlorthalidone (HYGROTON) 25 MG tablet Take 0.5 tablets (12.5 mg) by mouth daily Note decrease in dose compared to prior to admission; monitor kidney function, electrolytes, and blood pressure closely, review with primary clinic provider at upcoming visit      ketoconazole (NIZORAL) 2 % external cream Apply topically 2 times daily as needed for itching      losartan (COZAAR) 100 MG tablet Take 0.5 tablets (50 mg) by mouth See Admin Instructions HOLD for now as done in hospital; monitor blood pressure and recheck kidney function labs with primary clinic provider this week as discussed      metFORMIN  "(GLUCOPHAGE) 1000 MG tablet Take 1,000 mg by mouth 2 times daily (with meals)      sennosides (SENOKOT) 8.6 MG tablet Take 2 tablets by mouth Six days a week      simvastatin (ZOCOR) 20 MG tablet Take 20 mg by mouth daily      triamcinolone (KENALOG) 0.1 % external cream Apply topically 2 times daily as needed for irritation       No current facility-administered medications for this visit.     Past Medical History:   Diagnosis Date    Benign essential hypertension      No past surgical history on file.  Socioeconomic History    Marital status: Legally      Social Determinants of Health      [facility]     Review of Systems   Constitutional: Negative.    HENT:  Negative.     Eyes: Negative.    Respiratory: Negative.     Cardiovascular: Negative.    Gastrointestinal: Negative.    Endocrine: Negative.    Genitourinary: Negative.     Musculoskeletal: Negative.    Skin: Negative.    Neurological: Negative.    Hematological: Negative.    Psychiatric/Behavioral: Negative.     All other systems reviewed and are negative.      /70 (Cuff Size: Adult Regular)   Pulse 66   Temp 97  F (36.1  C) (Temporal)   Resp 16   Ht 1.651 m (5' 5\")   Wt 67.6 kg (149 lb)   SpO2 98%   BMI 24.79 kg/m      Physical Exam  Vitals and nursing note reviewed. Exam conducted with a chaperone present.   Constitutional:       Appearance: Normal appearance. He is well-developed, well-groomed and normal weight.      Comments: Cheerful, fit appearing black male   HENT:      Head: Normocephalic and atraumatic.      Mouth/Throat:      Mouth: Mucous membranes are dry.      Dentition: Dental caries present.   Eyes:      Extraocular Movements: Extraocular movements intact.      Conjunctiva/sclera: Conjunctivae normal.      Pupils: Pupils are equal, round, and reactive to light.   Cardiovascular:      Rate and Rhythm: Normal rate and regular rhythm.      Pulses: Normal " pulses.      Heart sounds: Normal heart sounds.   Pulmonary:      Effort: Pulmonary effort is normal.      Breath sounds: Normal breath sounds.   Abdominal:      General: Abdomen is flat. There is no distension.      Palpations: Abdomen is soft. There is no mass.      Tenderness: There is no abdominal tenderness. There is no guarding.   Musculoskeletal:         General: No swelling or deformity.      Cervical back: Normal range of motion and neck supple.   Lymphadenopathy:      Cervical: No cervical adenopathy.      Upper Body:      Right upper body: No axillary or epitrochlear adenopathy.      Left upper body: No axillary or epitrochlear adenopathy.   Skin:     General: Skin is warm and dry.   Neurological:      General: No focal deficit present.      Mental Status: He is alert and oriented to person, place, and time.      Cranial Nerves: No cranial nerve deficit.      Motor: No weakness.      Gait: Gait normal.      Deep Tendon Reflexes: Reflexes normal.   Psychiatric:         Mood and Affect: Mood normal.         Behavior: Behavior normal. Behavior is cooperative.         Thought Content: Thought content normal.         Judgment: Judgment normal.         Recent Results (from the past 720 hour(s))   Comprehensive metabolic panel    Collection Time: 06/28/24  6:22 PM   Result Value Ref Range    Sodium 137 135 - 145 mmol/L    Potassium 6.2 (HH) 3.4 - 5.3 mmol/L    Carbon Dioxide (CO2) 20 (L) 22 - 29 mmol/L    Anion Gap 10 7 - 15 mmol/L    Urea Nitrogen 58.0 (H) 8.0 - 23.0 mg/dL    Creatinine 1.79 (H) 0.67 - 1.17 mg/dL    GFR Estimate 41 (L) >60 mL/min/1.73m2    Calcium 9.1 8.8 - 10.2 mg/dL    Chloride 107 98 - 107 mmol/L    Glucose 121 (H) 70 - 99 mg/dL    Alkaline Phosphatase 66 40 - 150 U/L    AST 13 0 - 45 U/L    ALT 10 0 - 70 U/L    Protein Total 7.2 6.4 - 8.3 g/dL    Albumin 4.6 3.5 - 5.2 g/dL    Bilirubin Total <0.2 <=1.2 mg/dL   Troponin T, High Sensitivity    Collection Time: 06/28/24  6:22 PM   Result Value  Ref Range    Troponin T, High Sensitivity 19 <=22 ng/L   CBC with platelets and differential    Collection Time: 06/28/24  6:22 PM   Result Value Ref Range    WBC Count 3.7 (L) 4.0 - 11.0 10e3/uL    RBC Count 3.61 (L) 4.40 - 5.90 10e6/uL    Hemoglobin 10.9 (L) 13.3 - 17.7 g/dL    Hematocrit 31.8 (L) 40.0 - 53.0 %    MCV 88 78 - 100 fL    MCH 30.2 26.5 - 33.0 pg    MCHC 34.3 31.5 - 36.5 g/dL    RDW 12.7 10.0 - 15.0 %    Platelet Count 126 (L) 150 - 450 10e3/uL    % Neutrophils 56 %    % Lymphocytes 31 %    % Monocytes 6 %    % Eosinophils 6 %    % Basophils 1 %    % Immature Granulocytes 0 %    NRBCs per 100 WBC 0 <1 /100    Absolute Neutrophils 2.1 1.6 - 8.3 10e3/uL    Absolute Lymphocytes 1.2 0.8 - 5.3 10e3/uL    Absolute Monocytes 0.2 0.0 - 1.3 10e3/uL    Absolute Eosinophils 0.2 0.0 - 0.7 10e3/uL    Absolute Basophils 0.0 0.0 - 0.2 10e3/uL    Absolute Immature Granulocytes 0.0 <=0.4 10e3/uL    Absolute NRBCs 0.0 10e3/uL   EKG 12 lead    Collection Time: 06/28/24  7:06 PM   Result Value Ref Range    Systolic Blood Pressure  mmHg    Diastolic Blood Pressure  mmHg    Ventricular Rate 65 BPM    Atrial Rate 65 BPM    IA Interval 178 ms    QRS Duration 96 ms     ms    QTc 386 ms    P Axis 63 degrees    R AXIS 48 degrees    T Axis 63 degrees    Interpretation ECG       Sinus rhythm  Normal ECG  When compared with ECG of 02-JAN-2023 11:03,  T wave inversion no longer evident in Inferior leads  Confirmed by GENERATED REPORT, COMPUTER ([id number]),  Dat Savage ([id number]) on 6/28/2024 7:09:48 PM     Potassium    Collection Time: 06/28/24  8:14 PM   Result Value Ref Range    Potassium 4.8 3.4 - 5.3 mmol/L   Glucose by meter    Collection Time: 06/28/24  8:28 PM   Result Value Ref Range    GLUCOSE BY METER POCT 100 (H) 70 - 99 mg/dL   Glucose by meter    Collection Time: 06/28/24  8:53 PM   Result Value Ref Range    GLUCOSE BY METER POCT 43 (LL) 70 - 99 mg/dL   Glucose by meter    Collection Time: 06/28/24  9:18 PM    Result Value Ref Range    GLUCOSE BY METER POCT 142 (H) 70 - 99 mg/dL   Glucose by meter    Collection Time: 06/28/24  9:38 PM   Result Value Ref Range    GLUCOSE BY METER POCT 110 (H) 70 - 99 mg/dL   Potassium    Collection Time: 06/28/24 10:12 PM   Result Value Ref Range    Potassium 4.8 3.4 - 5.3 mmol/L   Glucose by meter    Collection Time: 06/28/24 10:16 PM   Result Value Ref Range    GLUCOSE BY METER POCT 86 70 - 99 mg/dL   Glucose by meter    Collection Time: 06/28/24 11:15 PM   Result Value Ref Range    GLUCOSE BY METER POCT 172 (H) 70 - 99 mg/dL   Glucose by meter    Collection Time: 06/28/24 11:51 PM   Result Value Ref Range    GLUCOSE BY METER POCT 195 (H) 70 - 99 mg/dL   Glucose by meter    Collection Time: 06/29/24  1:17 AM   Result Value Ref Range    GLUCOSE BY METER POCT 118 (H) 70 - 99 mg/dL   Glucose by meter    Collection Time: 06/29/24  2:02 AM   Result Value Ref Range    GLUCOSE BY METER POCT 93 70 - 99 mg/dL   Hemoglobin A1c    Collection Time: 06/29/24  6:12 AM   Result Value Ref Range    Hemoglobin A1C 5.8 (H) <5.7 %   Basic metabolic panel    Collection Time: 06/29/24  6:12 AM   Result Value Ref Range    Sodium 139 135 - 145 mmol/L    Potassium 5.0 3.4 - 5.3 mmol/L    Chloride 108 (H) 98 - 107 mmol/L    Carbon Dioxide (CO2) 21 (L) 22 - 29 mmol/L    Anion Gap 10 7 - 15 mmol/L    Urea Nitrogen 34.5 (H) 8.0 - 23.0 mg/dL    Creatinine 0.83 0.67 - 1.17 mg/dL    GFR Estimate >90 >60 mL/min/1.73m2    Calcium 9.0 8.8 - 10.2 mg/dL    Glucose 97 70 - 99 mg/dL   CBC with platelets and differential    Collection Time: 06/29/24  6:12 AM   Result Value Ref Range    WBC Count 3.4 (L) 4.0 - 11.0 10e3/uL    RBC Count 3.67 (L) 4.40 - 5.90 10e6/uL    Hemoglobin 11.0 (L) 13.3 - 17.7 g/dL    Hematocrit 32.0 (L) 40.0 - 53.0 %    MCV 87 78 - 100 fL    MCH 30.0 26.5 - 33.0 pg    MCHC 34.4 31.5 - 36.5 g/dL    RDW 12.6 10.0 - 15.0 %    Platelet Count 115 (L) 150 - 450 10e3/uL    % Neutrophils 60 %    % Lymphocytes  27 %    % Monocytes 7 %    % Eosinophils 5 %    % Basophils 1 %    % Immature Granulocytes 0 %    NRBCs per 100 WBC 0 <1 /100    Absolute Neutrophils 2.0 1.6 - 8.3 10e3/uL    Absolute Lymphocytes 0.9 0.8 - 5.3 10e3/uL    Absolute Monocytes 0.2 0.0 - 1.3 10e3/uL    Absolute Eosinophils 0.2 0.0 - 0.7 10e3/uL    Absolute Basophils 0.0 0.0 - 0.2 10e3/uL    Absolute Immature Granulocytes 0.0 <=0.4 10e3/uL    Absolute NRBCs 0.0 10e3/uL   Glucose by meter    Collection Time: 06/29/24 11:01 AM   Result Value Ref Range    GLUCOSE BY METER POCT 171 (H) 70 - 99 mg/dL   Lipid panel reflex to direct LDL Non-fasting    Collection Time: 07/05/24  7:25 AM   Result Value Ref Range    Cholesterol 123 <200 mg/dL    Triglycerides 125 <150 mg/dL    Direct Measure HDL 37 (L) >=40 mg/dL    LDL Cholesterol Calculated 61 <=100 mg/dL    Non HDL Cholesterol 86 <130 mg/dL    Patient Fasting > 8hrs? No    Comprehensive metabolic panel    Collection Time: 07/05/24  7:25 AM   Result Value Ref Range    Sodium 138 135 - 145 mmol/L    Potassium 4.5 3.4 - 5.3 mmol/L    Carbon Dioxide (CO2) 24 22 - 29 mmol/L    Anion Gap 10 7 - 15 mmol/L    Urea Nitrogen 19.6 8.0 - 23.0 mg/dL    Creatinine 0.80 0.67 - 1.17 mg/dL    GFR Estimate >90 >60 mL/min/1.73m2    Calcium 9.5 8.8 - 10.2 mg/dL    Chloride 104 98 - 107 mmol/L    Glucose 169 (H) 70 - 99 mg/dL    Alkaline Phosphatase 61 40 - 150 U/L    AST 18 0 - 45 U/L    ALT 11 0 - 70 U/L    Protein Total 7.2 6.4 - 8.3 g/dL    Albumin 4.7 3.5 - 5.2 g/dL    Bilirubin Total 0.2 <=1.2 mg/dL    Patient Fasting > 8hrs? No    CBC with Platelets    Collection Time: 07/05/24  7:25 AM   Result Value Ref Range    WBC Count 2.9 (L) 4.0 - 11.0 10e3/uL    RBC Count 3.68 (L) 4.40 - 5.90 10e6/uL    Hemoglobin 11.3 (L) 13.3 - 17.7 g/dL    Hematocrit 32.5 (L) 40.0 - 53.0 %    MCV 88 78 - 100 fL    MCH 30.7 26.5 - 33.0 pg    MCHC 34.8 31.5 - 36.5 g/dL    RDW 12.5 10.0 - 15.0 %    Platelet Count 102 (L) 150 - 450 10e3/uL   TSH     Collection Time: 07/05/24  7:25 AM   Result Value Ref Range    TSH 2.58 0.30 - 4.20 uIU/mL   Albumin Random Urine Quantitative with Creat Ratio    Collection Time: 07/05/24  7:27 AM   Result Value Ref Range    Creatinine Urine mg/dL 83.0 mg/dL    Albumin Urine mg/L <12.0 mg/L    Albumin Urine mg/g Cr     TSH with free T4 reflex    Collection Time: 07/15/24  1:50 PM   Result Value Ref Range    TSH 2.83 0.30 - 4.20 uIU/mL   Vitamin B12    Collection Time: 07/15/24  1:50 PM   Result Value Ref Range    Vitamin B12 306 232 - 1,245 pg/mL   Folate    Collection Time: 07/15/24  1:50 PM   Result Value Ref Range    Folic Acid 14.5 4.6 - 34.8 ng/mL   CBC with platelets and differential    Collection Time: 07/15/24  1:50 PM   Result Value Ref Range    WBC Count 3.0 (L) 4.0 - 11.0 10e3/uL    RBC Count 3.76 (L) 4.40 - 5.90 10e6/uL    Hemoglobin 11.0 (L) 13.3 - 17.7 g/dL    Hematocrit 32.8 (L) 40.0 - 53.0 %    MCV 87 78 - 100 fL    MCH 29.3 26.5 - 33.0 pg    MCHC 33.5 31.5 - 36.5 g/dL    RDW 13.2 10.0 - 15.0 %    Platelet Count 112 (L) 150 - 450 10e3/uL    % Neutrophils 51 %    % Lymphocytes 30 %    % Monocytes 9 %    % Eosinophils 9 %    % Basophils 1 %    % Immature Granulocytes 0 %    NRBCs per 100 WBC 0 <1 /100    Absolute Neutrophils 1.6 1.6 - 8.3 10e3/uL    Absolute Lymphocytes 0.9 0.8 - 5.3 10e3/uL    Absolute Monocytes 0.3 0.0 - 1.3 10e3/uL    Absolute Eosinophils 0.3 0.0 - 0.7 10e3/uL    Absolute Basophils 0.0 0.0 - 0.2 10e3/uL    Absolute Immature Granulocytes 0.0 <=0.4 10e3/uL    Absolute NRBCs 0.0 10e3/uL   Reticulocyte count    Collection Time: 07/15/24  1:50 PM   Result Value Ref Range    % Reticulocyte 2.2 (H) 0.5 - 2.0 %    Absolute Reticulocyte 0.082 0.025 - 0.095 10e6/uL     Recent Results (from the past 744 hour(s))   US Renal Complete Non-Vascular    Narrative    EXAM: US RENAL COMPLETE NON-VASCULAR  LOCATION: [facility]  DATE: 6/29/2024    INDICATION: Acute renal  insufficiency.  COMPARISON: None.  TECHNIQUE: Routine Bilateral Renal and Bladder Ultrasound.    FINDINGS:    RIGHT KIDNEY: 11.0 x 6.0 x 6.0 cm. No stones, masses or hydronephrosis. Normal parenchymal thickness and echogenicity.     LEFT KIDNEY: 11.1 x 6.4 x 6.7 cm. No stones, masses or hydronephrosis. Normal parenchymal thickness and echogenicity.     BLADDER: Bladder diverticulum on the right posterolaterally. No dependent debris or stone material. Ureteral jets not seen during sonogram.      Impression    IMPRESSION:  1.  Both kidneys are normal without stones, masses or hydronephrosis.    2.  Bladder diverticulum on the right posterolaterally.   US Renal Artery Bilateral    Narrative    For Patients:  As a result of the 21st Century Cures Act, medical imaging exams and procedure reports are released immediately into your electronic medical record.  You may view this report before your referring provider.  If you have questions, please contact your health care provider.      Examination:  Renal Doppler Ultrasound    Indication:  Hypertension     Technique:  Ultrasound images of the kidneys and associated vascular structures are obtained. Ultrasound performed using real-time gray scale imaging (B-mode 2D), color-flow Doppler and spectral analysis.     Comparison:  None available     Findings:  The right kidney measures 11.0 cm and the left kidney measures 11.2 cm. No hydronephrosis. Normal cortical thickness and echogenicity. No renal mass or stone is identified within the limits of ultrasound.    The urinary bladder is unremarkable.    Aortic velocity is 122 cm/sec.  Right renal artery velocity is 95 cm/sec (renal/aortic ratio 0.8).  Left renal artery velocity is 146 cm/sec (renal/aortic ratio 1.2).    There is no spectral broadening or parvus et tardus waveform in either of the renal arteries. The arcuate artery resistive indices and segmental artery acceleration indices are within normal limits in the kidneys  bilaterally.     Impression:  Normal exam. No sonographic evidence of renal artery stenosis.        Dictated by Ramón Hernandez MD @ 7/13/2024 4:59:25 PM    (Electronically Signed)

## 2024-08-13 NOTE — PHARMACY-ADMISSION MEDICATION HISTORY
Pharmacy Medication History  Admission medication history interview status for the 1/2/2023  admission is complete. See EPIC admission navigator for prior to admission medications     Location of Interview: Patient room  Medication history sources: Patient and Surescripts    Significant changes made to the medication list:  Added all meds    Removed: Naprosyn, Zanaflex    In the past week, patient estimated taking medication this percent of the time: greater than 90%    Additional medication history information:   none    Medication reconciliation completed by provider prior to medication history? No    Time spent in this activity: 20 min    Prior to Admission medications    Medication Sig Last Dose Taking? Auth Provider Long Term End Date   amLODIPine (NORVASC) 10 MG tablet Take 10 mg by mouth daily 1/2/2023 Yes Unknown, Entered By History Yes    ammonium lactate (LAC-HYDRIN) 12 % external lotion Apply topically 2 times daily as needed for dry skin Unknown at prn Yes Unknown, Entered By History     amoxicillin (AMOXIL) 500 MG capsule Take 1,000 mg by mouth 2 times daily X 14 days. Filled 12/31/22 1/2/2023 Yes Unknown, Entered By History     carvedilol (COREG) 25 MG tablet Take 25 mg by mouth 2 times daily (with meals) 1/2/2023 Yes Unknown, Entered By History Yes    chlorthalidone (HYGROTON) 25 MG tablet Take 25 mg by mouth daily 1/2/2023 at am Yes Unknown, Entered By History Yes    clarithromycin (BIAXIN) 500 MG tablet Take 500 mg by mouth 2 times daily X 14 days. Filled 12/31/22 1/2/2023 Yes Unknown, Entered By History     ketoconazole (NIZORAL) 2 % external cream Apply topically 2 times daily as needed for itching Unknown Yes Unknown, Entered By History     losartan (COZAAR) 100 MG tablet Take 100 mg by mouth daily 1/2/2023 Yes Unknown, Entered By History Yes    metFORMIN (GLUCOPHAGE) 1000 MG tablet Take 1,000 mg by mouth 2 times daily (with meals) 1/2/2023 Yes Unknown, Entered By History Yes    omeprazole  (PRILOSEC) 20 MG DR capsule Take 20 mg by mouth 2 times daily 1/2/2023 Yes Unknown, Entered By History     simvastatin (ZOCOR) 20 MG tablet Take 20 mg by mouth daily 1/2/2023 at am Yes Unknown, Entered By History Yes        The information provided in this note is only as accurate as the sources available at the time of update(s)      EKG - see results section for interpretation

## 2024-12-01 ENCOUNTER — HEALTH MAINTENANCE LETTER (OUTPATIENT)
Age: 69
End: 2024-12-01

## 2025-03-15 ENCOUNTER — HEALTH MAINTENANCE LETTER (OUTPATIENT)
Age: 70
End: 2025-03-15

## 2025-06-28 ENCOUNTER — HEALTH MAINTENANCE LETTER (OUTPATIENT)
Age: 70
End: 2025-06-28

## (undated) RX ORDER — METOPROLOL TARTRATE 1 MG/ML
INJECTION, SOLUTION INTRAVENOUS
Status: DISPENSED
Start: 2023-01-03

## (undated) RX ORDER — NITROGLYCERIN 0.4 MG/1
TABLET SUBLINGUAL
Status: DISPENSED
Start: 2023-01-03